# Patient Record
Sex: FEMALE | Race: WHITE | Employment: OTHER | URBAN - METROPOLITAN AREA
[De-identification: names, ages, dates, MRNs, and addresses within clinical notes are randomized per-mention and may not be internally consistent; named-entity substitution may affect disease eponyms.]

---

## 2017-01-09 ENCOUNTER — TELEPHONE (OUTPATIENT)
Dept: RHEUMATOLOGY | Facility: CLINIC | Age: 62
End: 2017-01-09

## 2017-01-09 ENCOUNTER — ALLIED HEALTH/NURSE VISIT (OUTPATIENT)
Dept: NURSING | Facility: CLINIC | Age: 62
End: 2017-01-09
Payer: COMMERCIAL

## 2017-01-09 DIAGNOSIS — M35.3 PMR (POLYMYALGIA RHEUMATICA) (H): ICD-10-CM

## 2017-01-09 DIAGNOSIS — E78.5 HYPERLIPIDEMIA LDL GOAL <130: Primary | ICD-10-CM

## 2017-01-09 DIAGNOSIS — Z79.52 LONG TERM CURRENT USE OF SYSTEMIC STEROIDS: ICD-10-CM

## 2017-01-09 DIAGNOSIS — Z23 NEED FOR PROPHYLACTIC VACCINATION AND INOCULATION AGAINST INFLUENZA: Primary | ICD-10-CM

## 2017-01-09 DIAGNOSIS — M35.3 PMR (POLYMYALGIA RHEUMATICA) (H): Primary | ICD-10-CM

## 2017-01-09 LAB
CRP SERPL-MCNC: <2.9 MG/L (ref 0–8)
ERYTHROCYTE [SEDIMENTATION RATE] IN BLOOD BY WESTERGREN METHOD: 15 MM/H (ref 0–30)

## 2017-01-09 PROCEDURE — 90686 IIV4 VACC NO PRSV 0.5 ML IM: CPT

## 2017-01-09 PROCEDURE — 90471 IMMUNIZATION ADMIN: CPT

## 2017-01-09 PROCEDURE — 82306 VITAMIN D 25 HYDROXY: CPT | Mod: 90 | Performed by: NURSE PRACTITIONER

## 2017-01-09 PROCEDURE — 85652 RBC SED RATE AUTOMATED: CPT | Performed by: INTERNAL MEDICINE

## 2017-01-09 PROCEDURE — 99000 SPECIMEN HANDLING OFFICE-LAB: CPT | Performed by: INTERNAL MEDICINE

## 2017-01-09 PROCEDURE — 99207 ZZC NO CHARGE NURSE ONLY: CPT

## 2017-01-09 PROCEDURE — 86140 C-REACTIVE PROTEIN: CPT | Mod: 90 | Performed by: INTERNAL MEDICINE

## 2017-01-09 PROCEDURE — 36415 COLL VENOUS BLD VENIPUNCTURE: CPT | Performed by: INTERNAL MEDICINE

## 2017-01-09 NOTE — TELEPHONE ENCOUNTER
lipitor       Last Written Prescription Date: 10/26/15  Last Fill Quantity: 90, # refills: 0    Last Office Visit with G, P or [facility] prescribing provider:  Going to the [facility] this week , will make a physical appt. After. She was here for lab work and a flu shot today  Future Office Visit:      CHOLESTEROL   Date Value Ref Range Status   05/06/2016 183 <200 mg/dL Final     HDL CHOLESTEROL   Date Value Ref Range Status   05/06/2016 57 >49 mg/dL Final     LDL CHOLESTEROL CALCULATED   Date Value Ref Range Status   05/06/2016 93 <100 mg/dL Final     Comment:     Desirable:       <100 mg/dl     LDL CHOLESTEROL DIRECT   Date Value Ref Range Status   05/08/2014 96 0 - 129 mg/dL Final     Comment:     Optimal:         <100 mg/dL   Near Optimal:     100-129 mg/dL   Borderline High:  130-159 mg/dL   High:             160-189 mg/dL   Very high:  greater than or equal to 190 mg/dL   Cannot estimate LDL when triglyceride exceeds 400 mg/dL       TRIGLYCERIDES   Date Value Ref Range Status   05/06/2016 166* <150 mg/dL Final     Comment:     Borderline high:  150-199 mg/dl   High:             200-499 mg/dl   Very high:       >499 mg/dl       CHOLESTEROL/HDL RATIO   Date Value Ref Range Status   10/26/2015 4.7 0.0 - 5.0 Final     ALT   Date Value Ref Range Status   09/07/2016 49 0 - 50 U/L Final

## 2017-01-09 NOTE — PROGRESS NOTES
Injectable Influenza Immunization Documentation    1.  Is the person to be vaccinated sick today?  No    2. Does the person to be vaccinated have an allergy to eggs or to a component of the vaccine?  No    3. Has the person to be vaccinated today ever had a serious reaction to influenza vaccine in the past?  No    4. Has the person to be vaccinated ever had Guillain-Stockdale syndrome?  No     Form completed by   Dimas Gaviria ma

## 2017-01-09 NOTE — TELEPHONE ENCOUNTER
Pt does not need any lab work for medication monitoring.  No mention of needed lab work in last OFV note.  Please advise.  Last OFV: 9/15/16

## 2017-01-09 NOTE — TELEPHONE ENCOUNTER
Pt called re: lab orders. CRP, sed rate. Also vit D (previously high). Has lab appt today (1/9/16), usually gets done prior to appts w/ Dr. Quevedo. Please advise. Can be reached at [phone]. Detailed VM fine. Message sent to rheum pool.

## 2017-01-10 LAB — DEPRECATED CALCIDIOL+CALCIFEROL SERPL-MC: 53 UG/L (ref 20–75)

## 2017-01-10 RX ORDER — ATORVASTATIN CALCIUM 10 MG/1
10 TABLET, FILM COATED ORAL DAILY
Qty: 30 TABLET | Refills: 0 | Status: SHIPPED | OUTPATIENT
Start: 2017-01-10 | End: 2017-01-30

## 2017-01-10 NOTE — TELEPHONE ENCOUNTER
Medication is being filled for 1 time refill only due to:  Patient needs to be seen because it has been more than one year since last visit.   Last seen 9/10/15.  Karey Mead RN

## 2017-01-11 NOTE — PROGRESS NOTES
Quick Note:    Results released to [email or web address]:   Ms. Giang,  Inflammatory markers are normal. Good!    Sincerely    Hernan Quevedo MD  ______

## 2017-01-13 ENCOUNTER — OFFICE VISIT (OUTPATIENT)
Dept: RHEUMATOLOGY | Facility: CLINIC | Age: 62
End: 2017-01-13
Attending: INTERNAL MEDICINE
Payer: COMMERCIAL

## 2017-01-13 VITALS
DIASTOLIC BLOOD PRESSURE: 85 MMHG | HEART RATE: 83 BPM | SYSTOLIC BLOOD PRESSURE: 134 MMHG | HEIGHT: 63 IN | BODY MASS INDEX: 21.62 KG/M2 | WEIGHT: 122 LBS | OXYGEN SATURATION: 100 %

## 2017-01-13 DIAGNOSIS — M35.3 PMR (POLYMYALGIA RHEUMATICA) (H): Primary | ICD-10-CM

## 2017-01-13 PROCEDURE — 99212 OFFICE O/P EST SF 10 MIN: CPT | Mod: ZF

## 2017-01-13 ASSESSMENT — PAIN SCALES - GENERAL: PAINLEVEL: NO PAIN (0)

## 2017-01-13 NOTE — MR AVS SNAPSHOT
"              After Visit Summary   1/13/2017    Shanae Giang    MRN: [id number]           Patient Information     Date Of Birth          1955        Visit Information        Provider Department      1/13/2017 11:00 AM Hernan Quevedo MD [facility] Rheumatology         Follow-ups after your visit        Follow-up notes from your care team     Return if symptoms worsen or fail to improve.      Who to contact     If you have questions or need follow up information about today's clinic visit or your schedule please contact [facility] RHEUMATOLOGY directly at [phone].  Normal or non-critical lab and imaging results will be communicated to you by ID[employer]gehart, letter or phone within 4 business days after the clinic has received the results. If you do not hear from us within 7 days, please contact the clinic through IDvergehart or phone. If you have a critical or abnormal lab result, we will notify you by phone as soon as possible.  Submit refill requests through [employer] or call your pharmacy and they will forward the refill request to us. Please allow 3 business days for your refill to be completed.          Additional Information About Your Visit        MyChart Information     [employer] gives you secure access to your electronic health record. If you see a primary care provider, you can also send messages to your care team and make appointments. If you have questions, please call your primary care clinic.  If you do not have a primary care provider, please call [phone] and they will assist you.        Care EveryWhere ID     This is your Care EveryWhere ID. This could be used by other organizations to access your Man medical records  [id number]        Your Vitals Were     Pulse Height BMI (Body Mass Index) Pulse Oximetry          83 1.6 m (5' 3\") 21.62 kg/m2 100%         Blood Pressure from Last 3 Encounters:   01/13/17 134/85   09/15/16 153/94   05/13/16 137/87    Weight from Last 3 Encounters: "   01/13/17 55.339 kg (122 lb)   09/15/16 57.607 kg (127 lb)   05/13/16 62.188 kg (137 lb 1.6 oz)              Today, you had the following     No orders found for display         Today's Medication Changes          These changes are accurate as of: 1/13/17 11:23 AM.  If you have any questions, ask your nurse or doctor.               Stop taking these medicines if you haven't already. Please contact your care team if you have questions.     melatonin 3 MG tablet   Stopped by:  Hernan Quevedo MD           predniSONE 1 MG tablet   Commonly known as:  DELTASONE   Stopped by:  Hernan Quevedo MD                    Primary Care Provider Office Phone # Fax #    Jeremías Arnold [phone]       [facility] [address] [place] [id number]        Thank you!     Thank you for choosing [facility] RHEUMATOLOGY  for your care. Our goal is always to provide you with excellent care. Hearing back from our patients is one way we can continue to improve our services. Please take a few minutes to complete the written survey that you may receive in the mail after your visit with us. Thank you!             Your Updated Medication List - Protect others around you: Learn how to safely use, store and throw away your medicines at www.disposemymeds.org.          This list is accurate as of: 1/13/17 11:23 AM.  Always use your most recent med list.                   Brand Name Dispense Instructions for use    atorvastatin 10 MG tablet    LIPITOR    30 tablet    Take 1 tablet (10 mg) by mouth daily       glucosamine-chondroitin 500-400 MG Caps per capsule      Take 1 capsule by mouth daily       MULTIVITAMIN TABS   OR      TAKE ONE TABLET BY MOUTH DAILY       TYLENOL PO      Take 500 mg by mouth every 6 hours as needed for mild pain or fever       UNABLE TO FIND      Take 1 tablet by mouth daily MEDICATION NAME: Tumeric       VITAMIN D (CHOLECALCIFEROL) PO      Take 1,000 Units  by mouth daily

## 2017-01-13 NOTE — PROGRESS NOTES
"Rheumatology Visit     Shanae Giang MRN# [id number]   YOB: 1955      Primary care provider: Jeremías Arnold          Assessment/Plan:   1. PMR w/o GCA - IN REMISSION OFF OF PREDNISONE.     No symptoms GCA. Tapered off prednisone December 2016, tolerating well. Age appropriate malignancy screen negative 2015. Echo NL. Last episode of flare up of symptoms in 8/15 was likely from secondary adrenal insufficiency. Elevation in CRP needs to be co-related with clinical symptoms.    2. PTSD and panic attacks. Per PCP  3. Borderline ALT elevation  4. Osteoarthritis in hands  5. Left hip arthroplasty at age 38    Patient had been educated about GCA symptoms.     Continue calcium and vitamin D 2000 u/day. DEXA shows osteopenia. Previously on fosamax.  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     Follow up as needed with new symptoms or inflammatory marker elevations.     ATTENDING TIE IN NOTE:   I saw the patient with the medical student, who acted as a scribe. This note accurately reflects my dictation and plan of care.    Hernan Quevedo MD   Rheumatic and Autoimmune Diseases        History of Present Illness:   Shanae Giang is a 59 year old female who here for follow-up for PMR, high CRP then NL with prednisone. Here with Gaby.     Forward hx 2-: C/o Fall 2014 profound lack of energy from work, with hip pain across her low back then at times mostly down her outside of right thigh/leg. Seen hip surgeon \"look good\". Burning, achey, hard to walk, rare shooting pain more of a throb. URI end of Oct/early Nov with sinus infections. Was taking advil, still pain. Not sure if influenza, but cleared up. Early Dec more this pronounced fatigue. Work 7-3 as . Would want to go to bed after bed. The same symptoms returned, may radiating right leg/thigh not always to " "foot and mid across back. Returned to hip surgeon \"call clear\" 3 weeks ago. Taking advil \"alot of advil 3-4 tablets four times a day\". Upper lip swelling 1/28 day seen hip specialist \"looked like a duck\" lasted 24 hours. No changes color, no tongue, or mouth irritation, no throat and breathing was good. Read insert for advil \"face swelling\" so stopped now not taking. Internist may not related to advil. Had taking on/off for 25 yrs w/o s/e. 1/29/2015 See PCP migratory left knee pain (no swelling), left thigh, left/right forearms, right knees, bilateral wrists. Diffuse. Only the upper back only this last time ws upper back everything hurt. Hard to walk or or lift up extremities. Not in her joints, more of the tissues. Never in hands. Some stiffness in her hands-never pain in the mornings mostly. +heels bother Will like to wash the dishes to help her hands stiff. (this hands stiffness mostly mornings then improves but not changing). No associated swelling or redness. Seen PCP 2-3. CRP 78. Thought was PMR, but wanted to do more work-up seen Dr. Hidalgo (internist) did more testing 2/10/2015. Increased pain, progressed, smell of food bad. 2/3 Physical therapy started-too much pain to do now. No morning hip girdles or upper back stiffness.   2-11 CRP 94. Prednisone 20 mg day. Took 3 days. 1st day 40% improved pain 10 down to 4/10, day 2-3 further some s/e poor sleep, more shakey, more energy, moodiness. Now-3rd day after prednisone. More energy. Still winded. \"Feels deconditioned from this process\". Improved pain in all areas [wrists, knees, back, thighs, forearms]. Now gone with tylenol #3 and prednisone, no hydrocodone but still winded. Appetite improved. Lost 10 lbs in 3 weeks during this. +wake with night sweats (this started 2 weeks ago with the intense pain), occasional chills but not now since Monday. Using heating pad. Gaby has been reading about PMR/GCA and asking her these questions-not really having. " "    Interval hx: 6/15  Next day after seen me, was almost 100% \"I wish you could have seen me\". Seen PCP for cancer work-up and NL echo. On prednisone now 10 mg day for the past month when seen Dr. Quevedo. Now with recent normal CRP will taper down by 1 mg month.     Admits post-traumatic stress syndrome, and was having this when I seen her. Improved energy. No symptoms. Quit her job. Mild symptoms with wean for about 3 days then ok (shoulder, right ankle)All other symptoms resolved. No infections. Tolerating the prednisone without any side-effects. 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 Denies fever, chills, cough, SOB, night sweats, weight loss, h/a, jaw claudication, scalp tenderness, vision changes. No OTC medications now. No joint or muscle pains. No neurological changes.     Interim history 8/15  EVault message: \"I am one of your patients who sees you for help managing PMR. I am set to reduce my prednisone dose gregory 8mg to 7mg this Friday. I have had quite a bit more struggles with this current reduction...more leg and hip pain and weakness; more fatigue; more disruption of nighttime sleep. With the past reductions the first two weeks of the new dose of prednisone I have always had more symptoms than the last two weeks. With the 8mg dose I have not noticed any improvement as the weeks have been progressing.. I think I would like to wait a week or two before reducing my prednisone from 8mg to 7mg. What do you think of this idea?    Current dose 8mg PO daily. Her above mentioned symptoms resolved after few weeks.   Her sed rate and CRP were normal when she had these active symptoms.     Interim history 10/15  Prednisone current dose is 7 mg PO daily.   Tapering down by 0.5mg every month.   Feeling stronger.   No new symptoms.   Inflammatory markers are normal.     Interim history 1/16  Prednisone current dose 5.5mg PO daily  Tapering down by 0.5mg every month.   No new symptoms.   First two weeks of " prednisone taper, she gets 'wierd' sensation in thighs; fatigue +ve but this resolves in about 2 weeks  No GCA symptoms    Interim history September 15, 2016    Prednisone current dose 1.5 mg PO daily  Tapering down by 0.5mg every month.   On and off muscle pain in right thigh.   No GCA symptoms  Feels good    2017  Off all prednisone since early 2016  Feeling well, started using tumeric   No fevers, headaches, jaw claudication  'Rubber-band stretching' sensation in thighs and hips immediately after waking up that resolves by the end of a shower. Also has 'creepy-crawling' sensation at night that seems to be associated with stress - feels like she needs to move.   Hands are stiff in the morning and improve within 1 hour or with a hot shower. No joint pains - chronic and stable over many years.           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  PGM-alcohol  Brother-HTN, alcohol, depression  Sister-alcohol, GERD, ulcers, psyche PTSD  Sister- depression  Father-alcohol, depression, stomach removed for ulcers, irritable bowel, intestines and stomach removed, see arthritis in hands (shows me DIPs and PIP)  No autoimmune disorders, psoriasis, UC, crohn's, SLE, RA, PsA, gout, lupus, scleroderma.   No MS    Social hx:  .  Previously [employer]. Now works as a potter. Never smoker no alcohol use. 3 adopted           Allergies/Medications:     Allergies   Allergen Reactions     Azithromycin Diarrhea     Ibuprofen Swelling     Around lips        Current Outpatient  "Prescriptions   Medication Sig Dispense Refill     UNABLE TO FIND Take 1 tablet by mouth daily MEDICATION NAME: Tumeric       atorvastatin (LIPITOR) 10 MG tablet Take 1 tablet (10 mg) by mouth daily 30 tablet 0     glucosamine-chondroitin 500-400 MG CAPS Take 1 capsule by mouth daily       VITAMIN D, CHOLECALCIFEROL, PO Take 1,000 Units by mouth daily       Acetaminophen (TYLENOL PO) Take 500 mg by mouth every 6 hours as needed for mild pain or fever       MULTIVITAMIN TABS   OR TAKE ONE TABLET BY MOUTH DAILY              Physical Exam:   Blood pressure 134/85, pulse 83, height 1.6 m (5' 3\"), weight 55.339 kg (122 lb), SpO2 100 %, not currently breastfeeding.  Wt Readings from Last 4 Encounters:   01/13/17 55.339 kg (122 lb)   09/15/16 57.607 kg (127 lb)   05/13/16 62.188 kg (137 lb 1.6 oz)   01/12/16 61.689 kg (136 lb)       Constitutional: appears stated age, pleasant and healthy appearing.   Eyes: nl EOM, PERRLA, vision, conjunctiva  ENT: nl external ears, nose,lips, teeth, gums.   Neck: supple and non-tender.  Resp: lungs clear to auscultation  CV: RRR, no murmurs, rubs or gallops, no edema  GI: no ABD mass or tenderness, no HSM. No RUQ pain.   : not tested  Lymph: no cervical, supraclavicular, or epitrochlear nodes  MSK: [place]ber[place] and praful nodes present over DIP and PIP joints respectively. Left elbow with 10 degrees of flexion contracture. TMJ, neck, shoulder,  wrist, hip, knee, and ankle were examined and  found normal. No active synovitis or deformity. Full ROM.  Normal  strength. No dactylitis,  tenosynovitis, enthespathy. Negative MCP squeeze.   Skin: no nail pitting, alopecia, rash, nodules or lesions  Psych: nl judgement, orientation, memory, affect.         Labs/Imaging:   Reviewed medications, labs, imaging, and EMR.     Results for orders placed or performed in visit on 01/09/17   ESR   Result Value Ref Range    Sed Rate 15 0 - 30 mm/h   CRP inflammation   Result Value Ref Range    CRP " Inflammation <2.9 0.0 - 8.0 mg/L   Vitamin D Deficiency   Result Value Ref Range    Vitamin D Deficiency screening 53 20 - 75 ug/L     Recent Labs   Lab Test  09/07/16   0948  05/04/15   1501  02/11/15   1406   WBC  6.7  10.7  11.3*   HGB  13.1  12.8  11.2*   HCT  41.5  41.6  36.6   MCV  94  91  91   PLT  219  258  514*   BUN   --    --   14   TSH   --    --   1.23   AST  22  21  21   ALT  49  53*  53*   ALKPHOS   --    --   104     Component      Latest Ref Rng 2/3/2015 2/11/2015   Rheumatoid Factor      <20 IU/mL <20    Sed Rate      0 - 30 mm/h 80 (H)    CRP Inflammation      0.0 - 8.0 mg/L 78.0 (H) 94.0 (H)   MICHAEL Screen by EIA      <1.0 <1.0    Interpretation:  Negative    Cyclic Cit Pept IgG/IgA      <20 UNITS <20    Interpretation:  Negative    Lyme Disease Antibodies Serum      0.00 - 0.89 0.203

## 2017-01-13 NOTE — NURSING NOTE
"Chief Complaint   Patient presents with     RECHECK     4 month follow up for PMR       Initial /85 mmHg  Pulse 83  Ht 1.6 m (5' 3\")  Wt 55.339 kg (122 lb)  BMI 21.62 kg/m2  SpO2 100% Estimated body mass index is 21.62 kg/(m^2) as calculated from the following:    Height as of this encounter: 1.6 m (5' 3\").    Weight as of this encounter: 55.339 kg (122 lb).  BP completed using cuff size: ruchi Ash CMA    "

## 2017-01-13 NOTE — Clinical Note
"1/13/2017      RE: Shanae Giang  [address] [facility] [address]       Rheumatology Visit     Shanae Giang MRN# [id number]   YOB: 1955      Primary care provider: Jeremías Arnold          Assessment/Plan:   1. PMR w/o GCA - IN REMISSION OFF OF PREDNISONE.     No symptoms GCA. Tapered off prednisone December 2016, tolerating well. Age appropriate malignancy screen negative 2015. Echo NL. Last episode of flare up of symptoms in 8/15 was likely from secondary adrenal insufficiency. Elevation in CRP needs to be co-related with clinical symptoms.    2. PTSD and panic attacks. Per PCP  3. Borderline ALT elevation  4. Osteoarthritis in hands  5. Left hip arthroplasty at age 38    Patient had been educated about GCA symptoms.     Continue calcium and vitamin D 2000 u/day. DEXA shows osteopenia. Previously on fosamax.  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     Follow up as needed with new symptoms or inflammatory marker elevations.     ATTENDING TIE IN NOTE:   I saw the patient with the medical student, who acted as a scribe. This note accurately reflects my dictation and plan of care.    Hernan Quevedo MD   Rheumatic and Autoimmune Diseases        History of Present Illness:   Shanae Giang is a 59 year old female who here for follow-up for PMR, high CRP then NL with prednisone. Here with Gaby.     Forward hx 2-: C/o Fall 2014 profound lack of energy from work, with hip pain across her low back then at times mostly down her outside of right thigh/leg. Seen hip surgeon \"look good\". Burning, achey, hard to walk, rare shooting pain more of a throb. URI end of Oct/early Nov with sinus infections. Was taking advil, still pain. Not sure if influenza, but cleared up. Early Dec more this pronounced fatigue. Work 7-3 as . Would want " "to go to bed after bed. The same symptoms returned, may radiating right leg/thigh not always to foot and mid across back. Returned to hip surgeon \"call clear\" 3 weeks ago. Taking advil \"alot of advil 3-4 tablets four times a day\". Upper lip swelling 1/28 day seen hip specialist \"looked like a duck\" lasted 24 hours. No changes color, no tongue, or mouth irritation, no throat and breathing was good. Read insert for advil \"face swelling\" so stopped now not taking. Internist may not related to advil. Had taking on/off for 25 yrs w/o s/e. 1/29/2015 See PCP migratory left knee pain (no swelling), left thigh, left/right forearms, right knees, bilateral wrists. Diffuse. Only the upper back only this last time ws upper back everything hurt. Hard to walk or or lift up extremities. Not in her joints, more of the tissues. Never in hands. Some stiffness in her hands-never pain in the mornings mostly. +heels bother Will like to wash the dishes to help her hands stiff. (this hands stiffness mostly mornings then improves but not changing). No associated swelling or redness. Seen PCP 2-3. CRP 78. Thought was PMR, but wanted to do more work-up seen Dr. Hidalgo (internist) did more testing 2/10/2015. Increased pain, progressed, smell of food bad. 2/3 Physical therapy started-too much pain to do now. No morning hip girdles or upper back stiffness.   2-11 CRP 94. Prednisone 20 mg day. Took 3 days. 1st day 40% improved pain 10 down to 4/10, day 2-3 further some s/e poor sleep, more shakey, more energy, moodiness. Now-3rd day after prednisone. More energy. Still winded. \"Feels deconditioned from this process\". Improved pain in all areas [wrists, knees, back, thighs, forearms]. Now gone with tylenol #3 and prednisone, no hydrocodone but still winded. Appetite improved. Lost 10 lbs in 3 weeks during this. +wake with night sweats (this started 2 weeks ago with the intense pain), occasional chills but not now since Monday. Using heating pad. " "Gaby has been reading about PMR/GCA and asking her these questions-not really having.     Interval hx: 6/15  Next day after seen me, was almost 100% \"I wish you could have seen me\". Seen PCP for cancer work-up and NL echo. On prednisone now 10 mg day for the past month when seen Dr. Quevedo. Now with recent normal CRP will taper down by 1 mg month.     Admits post-traumatic stress syndrome, and was having this when I seen her. Improved energy. No symptoms. Quit her job. Mild symptoms with wean for about 3 days then ok (shoulder, right ankle)All other symptoms resolved. No infections. Tolerating the prednisone without any side-effects. 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 Denies fever, chills, cough, SOB, night sweats, weight loss, h/a, jaw claudication, scalp tenderness, vision changes. No OTC medications now. No joint or muscle pains. No neurological changes.     Interim history 8/15  [employer] message: \"I am one of your patients who sees you for help managing PMR. I am set to reduce my prednisone dose gregory 8mg to 7mg this Friday. I have had quite a bit more struggles with this current reduction...more leg and hip pain and weakness; more fatigue; more disruption of nighttime sleep. With the past reductions the first two weeks of the new dose of prednisone I have always had more symptoms than the last two weeks. With the 8mg dose I have not noticed any improvement as the weeks have been progressing.. I think I would like to wait a week or two before reducing my prednisone from 8mg to 7mg. What do you think of this idea?    Current dose 8mg PO daily. Her above mentioned symptoms resolved after few weeks.   Her sed rate and CRP were normal when she had these active symptoms.     Interim history 10/15  Prednisone current dose is 7 mg PO daily.   Tapering down by 0.5mg every month.   Feeling stronger.   No new symptoms.   Inflammatory markers are normal.     Interim history 1/16  Prednisone current dose 5.5mg " PO daily  Tapering down by 0.5mg every month.   No new symptoms.   First two weeks of prednisone taper, she gets 'wierd' sensation in thighs; fatigue +ve but this resolves in about 2 weeks  No GCA symptoms    Interim history September 15, 2016    Prednisone current dose 1.5 mg PO daily  Tapering down by 0.5mg every month.   On and off muscle pain in right thigh.   No GCA symptoms  Feels good    2017  Off all prednisone since early 2016  Feeling well, started using tumeric   No fevers, headaches, jaw claudication  'Rubber-band stretching' sensation in thighs and hips immediately after waking up that resolves by the end of a shower. Also has 'creepy-crawling' sensation at night that seems to be associated with stress - feels like she needs to move.   Hands are stiff in the morning and improve within 1 hour or with a hot shower. No joint pains - chronic and stable over many years.           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  PGM-alcohol  Brother-HTN, alcohol, depression  Sister-alcohol, GERD, ulcers, psyche PTSD  Sister- depression  Father-alcohol, depression, stomach removed for ulcers, irritable bowel, intestines and stomach removed, see arthritis in hands (shows me DIPs and PIP)  No autoimmune disorders, psoriasis, UC, crohn's, SLE, RA, PsA, gout, lupus, scleroderma.   No MS    Social hx:  .  Previously [employer]. Now works as a potter. Never smoker no alcohol use. 3 adopted           Allergies/Medications:     Allergies   Allergen Reactions      "Azithromycin Diarrhea     Ibuprofen Swelling     Around lips        Current Outpatient Prescriptions   Medication Sig Dispense Refill     UNABLE TO FIND Take 1 tablet by mouth daily MEDICATION NAME: Tumeric       atorvastatin (LIPITOR) 10 MG tablet Take 1 tablet (10 mg) by mouth daily 30 tablet 0     glucosamine-chondroitin 500-400 MG CAPS Take 1 capsule by mouth daily       VITAMIN D, CHOLECALCIFEROL, PO Take 1,000 Units by mouth daily       Acetaminophen (TYLENOL PO) Take 500 mg by mouth every 6 hours as needed for mild pain or fever       MULTIVITAMIN TABS   OR TAKE ONE TABLET BY MOUTH DAILY              Physical Exam:   Blood pressure 134/85, pulse 83, height 1.6 m (5' 3\"), weight 55.339 kg (122 lb), SpO2 100 %, not currently breastfeeding.  Wt Readings from Last 4 Encounters:   01/13/17 55.339 kg (122 lb)   09/15/16 57.607 kg (127 lb)   05/13/16 62.188 kg (137 lb 1.6 oz)   01/12/16 61.689 kg (136 lb)       Constitutional: appears stated age, pleasant and healthy appearing.   Eyes: nl EOM, PERRLA, vision, conjunctiva  ENT: nl external ears, nose,lips, teeth, gums.   Neck: supple and non-tender.  Resp: lungs clear to auscultation  CV: RRR, no murmurs, rubs or gallops, no edema  GI: no ABD mass or tenderness, no HSM. No RUQ pain.   : not tested  Lymph: no cervical, supraclavicular, or epitrochlear nodes  MSK: Heberden and praful nodes present over DIP and PIP joints respectively. Left elbow with 10 degrees of flexion contracture. TMJ, neck, shoulder,  wrist, hip, knee, and ankle were examined and  found normal. No active synovitis or deformity. Full ROM.  Normal  strength. No dactylitis,  tenosynovitis, enthespathy. Negative MCP squeeze.   Skin: no nail pitting, alopecia, rash, nodules or lesions  Psych: nl judgement, orientation, memory, affect.         Labs/Imaging:   Reviewed medications, labs, imaging, and EMR.     Results for orders placed or performed in visit on 01/09/17   ESR   Result Value Ref " Range    Sed Rate 15 0 - 30 mm/h   CRP inflammation   Result Value Ref Range    CRP Inflammation <2.9 0.0 - 8.0 mg/L   Vitamin D Deficiency   Result Value Ref Range    Vitamin D Deficiency screening 53 20 - 75 ug/L     Recent Labs   Lab Test  09/07/16   0948  05/04/15   1501  02/11/15   1406   WBC  6.7  10.7  11.3*   HGB  13.1  12.8  11.2*   HCT  41.5  41.6  36.6   MCV  94  91  91   PLT  219  258  514*   BUN   --    --   14   TSH   --    --   1.23   AST  22  21  21   ALT  49  53*  53*   ALKPHOS   --    --   104     Component      Latest Ref Rng 2/3/2015 2/11/2015   Rheumatoid Factor      <20 IU/mL <20    Sed Rate      0 - 30 mm/h 80 (H)    CRP Inflammation      0.0 - 8.0 mg/L 78.0 (H) 94.0 (H)   MICHAEL Screen by EIA      <1.0 <1.0    Interpretation:  Negative    Cyclic Cit Pept IgG/IgA      <20 UNITS <20    Interpretation:  Negative    Lyme Disease Antibodies Serum      0.00 - 0.89 0.203          Hernan Quevedo MD

## 2017-01-23 RX ORDER — ATORVASTATIN CALCIUM 10 MG/1
TABLET, FILM COATED ORAL
Start: 2017-01-23

## 2017-01-23 NOTE — TELEPHONE ENCOUNTER
Denied  Refill request too early; Rx sent 1/10/2017 for 1 month supply; patient needs appt  Left non detailed VM for pt asking that they callback and schedule (annual exam)  Tonja MARMOLEJO RN      Pending Prescriptions:                       Disp   Refills    atorvastatin (LIPITOR) 10 MG tablet [Pharm*90 tab*PRN      Sig: TAKE 1 TABLET BY MOUTH DAILY         Last Written Prescription Date: 1/10/2017  Last Fill Quantity: 30, # refills: 0    Last Office Visit with [facility], [facility] or [facility] prescribing provider:  9/10/2015   Future Office Visit:      CHOLESTEROL   Date Value Ref Range Status   05/06/2016 183 <200 mg/dL Final     HDL CHOLESTEROL   Date Value Ref Range Status   05/06/2016 57 >49 mg/dL Final     LDL CHOLESTEROL CALCULATED   Date Value Ref Range Status   05/06/2016 93 <100 mg/dL Final     Comment:     Desirable:       <100 mg/dl     LDL CHOLESTEROL DIRECT   Date Value Ref Range Status   05/08/2014 96 0 - 129 mg/dL Final     Comment:     Optimal:         <100 mg/dL   Near Optimal:     100-129 mg/dL   Borderline High:  130-159 mg/dL   High:             160-189 mg/dL   Very high:  greater than or equal to 190 mg/dL   Cannot estimate LDL when triglyceride exceeds 400 mg/dL       TRIGLYCERIDES   Date Value Ref Range Status   05/06/2016 166* <150 mg/dL Final     Comment:     Borderline high:  150-199 mg/dl   High:             200-499 mg/dl   Very high:       >499 mg/dl       CHOLESTEROL/HDL RATIO   Date Value Ref Range Status   10/26/2015 4.7 0.0 - 5.0 Final     ALT   Date Value Ref Range Status   09/07/2016 49 0 - 50 U/L Final

## 2017-01-30 RX ORDER — ALENDRONATE SODIUM 35 MG/1
TABLET ORAL
Refills: 5 | COMMUNITY
Start: 2016-08-22 | End: 2017-01-31

## 2017-01-31 ENCOUNTER — OFFICE VISIT (OUTPATIENT)
Dept: FAMILY MEDICINE | Facility: CLINIC | Age: 62
End: 2017-01-31
Payer: COMMERCIAL

## 2017-01-31 VITALS
HEART RATE: 97 BPM | DIASTOLIC BLOOD PRESSURE: 74 MMHG | RESPIRATION RATE: 14 BRPM | BODY MASS INDEX: 21.79 KG/M2 | TEMPERATURE: 98.1 F | SYSTOLIC BLOOD PRESSURE: 126 MMHG | HEIGHT: 63 IN | OXYGEN SATURATION: 99 % | WEIGHT: 123 LBS

## 2017-01-31 DIAGNOSIS — Z00.01 ENCOUNTER FOR ROUTINE ADULT HEALTH EXAMINATION WITH ABNORMAL FINDINGS: Primary | ICD-10-CM

## 2017-01-31 DIAGNOSIS — E78.5 HYPERLIPIDEMIA LDL GOAL <130: ICD-10-CM

## 2017-01-31 DIAGNOSIS — D23.5 BENIGN NEOPLASM OF SKIN OF TRUNK, EXCEPT SCROTUM: ICD-10-CM

## 2017-01-31 DIAGNOSIS — Z12.11 SCREEN FOR COLON CANCER: ICD-10-CM

## 2017-01-31 PROCEDURE — 11100 HC BIOPSY SKIN/SUBQ/MUC MEM, SINGLE LESION: CPT | Performed by: FAMILY MEDICINE

## 2017-01-31 PROCEDURE — 99396 PREV VISIT EST AGE 40-64: CPT | Mod: 25 | Performed by: FAMILY MEDICINE

## 2017-01-31 RX ORDER — ATORVASTATIN CALCIUM 10 MG/1
10 TABLET, FILM COATED ORAL DAILY
Qty: 90 TABLET | Refills: 3 | Status: SHIPPED | OUTPATIENT
Start: 2017-01-31 | End: 2019-11-07 | Stop reason: ALTCHOICE

## 2017-01-31 NOTE — PATIENT INSTRUCTIONS
Preventive Health Recommendations  Female Ages 50 - 64    PLEASE CALL TO SCHEDULE YOUR MAMMOGRAM  [facility] [phone]  [facility] [phone]        Yearly exam: See your health care provider every year in order to  o Review health changes.   o Discuss preventive care.    o Review your medicines if your doctor has prescribed any.      Get a Pap test every three years (unless you have an abnormal result and your provider advises testing more often).    If you get Pap tests with HPV test, you only need to test every 5 years, unless you have an abnormal result.     You do not need a Pap test if your uterus was removed (hysterectomy) and you have not had cancer.    You should be tested each year for STDs (sexually transmitted diseases) if you're at risk.     Have a mammogram every 1 to 2 years.    Have a colonoscopy at age 50, or have a yearly FIT test (stool test). These exams screen for colon cancer.      Have a cholesterol test every 5 years, or more often if advised.    Have a diabetes test (fasting glucose) every three years. If you are at risk for diabetes, you should have this test more often.     If you are at risk for osteoporosis (brittle bone disease), think about having a bone density scan (DEXA).    Shots: Get a flu shot each year. Get a tetanus shot every 10 years.    Nutrition:     Eat at least 5 servings of fruits and vegetables each day.    Eat whole-grain bread, whole-wheat pasta and brown rice instead of white grains and rice.    Talk to your provider about Calcium and Vitamin D.     Lifestyle    Exercise at least 150 minutes a week (30 minutes a day, 5 days a week). This will help you control your weight and prevent disease.    Limit alcohol to one drink per day.    No smoking.     Wear sunscreen to prevent skin cancer.     See your dentist every six months for an exam and cleaning.    See your eye doctor every 1 to 2 years.

## 2017-01-31 NOTE — PROGRESS NOTES
Answers for HPI/ROS submitted by the patient on 1/29/2017   Annual Exam:  Getting at least 3 servings of Calcium per day:: Yes  Bi-annual eye exam:: Yes  Dental care twice a year:: Yes  Sleep apnea or symptoms of sleep apnea:: None  Diet:: Other  Frequency of exercise:: 2-3 days/week  Duration of exercise:: 15-30 minutes  Taking medications regularly:: Yes  Medication side effects:: Not applicable  Additional concerns today:: No  PHQ-2 Depressed: Not at all, Not at all  PHQ-2 Score: 0

## 2017-01-31 NOTE — NURSING NOTE
"  Chief Complaint   Patient presents with     Physical     /74 mmHg  Pulse 97  Temp(Src) 98.1  F (36.7  C) (Oral)  Resp 14  Ht 5' 3\" (1.6 m)  Wt 123 lb (55.792 kg)  BMI 21.79 kg/m2  SpO2 99%  Breastfeeding? No Estimated body mass index is 21.79 kg/(m^2) as calculated from the following:    Height as of this encounter: 5' 3\" (1.6 m).    Weight as of this encounter: 123 lb (55.792 kg).  BP completed using cuff size: regular       Health Maintenance due pending provider review:  Mammogram, Pap Smear and Colonoscopy/FIT    PAP--Possibly completing today, per Provider review, MAMMO/COLON--Gave pt phone number, and pended order for Mammo and/or Colonoscopy and FIT--test ordered, pt advised to  from lab    JESSICA Dave CMA      "

## 2017-01-31 NOTE — PROGRESS NOTES
SUBJECTIVE:     CC: Shanae Giang is an 61 year old woman who presents for preventive health visit.     Physical  Annual:     Getting at least 3 servings of Calcium per day::  Yes    Bi-annual eye exam::  Yes    Dental care twice a year::  Yes    Sleep apnea or symptoms of sleep apnea::  None    Diet::  Other    Frequency of exercise::  2-3 days/week    Duration of exercise::  15-30 minutes    Taking medications regularly::  Yes    Medication side effects::  Not applicable    Additional concerns today::  No    [facility]            Today's PHQ-2 Score:   PHQ-2 ( 1999 [employer]) 1/29/2017   Q1: Little interest or pleasure in doing things -   Q2: Feeling down, depressed or hopeless -   PHQ-2 Score -   Little interest or pleasure in doing things Not at all   Feeling down, depressed or hopeless Not at all   PHQ-2 Score 0       Abuse: Current or Past(Physical, Sexual or Emotional)- No  Do you feel safe in your environment - Yes    Social History   Substance Use Topics     Smoking status: Never Smoker      Smokeless tobacco: Never Used     Alcohol Use: 0.0 - 0.5 oz/week     0-1 drink(s) per week      Comment: Very Rarely-3 a year     The patient does not drink >3 drinks per day nor >7 drinks per week.    Recent Labs   Lab Test  05/06/16   0923  10/26/15   0843   12/05/12   0709   CHOL  183  297*   --   181   HDL  57  63   --   57   LDL  93  200*   < >  106   TRIG  166*  169*   --   85   CHOLHDLRATIO   --   4.7   --   3.2   NHDL  126   --    --    --     < > = values in this interval not displayed.       Reviewed orders with patient.  Reviewed health maintenance and updated orders accordingly - Yes    Mammo Decision Support:  Patient over age 50, mutual decision to screen reflected in health maintenance.    Pertinent mammograms are reviewed under the imaging tab.  History of abnormal Pap smear: NO - age 30-65 PAP every 5 years with negative HPV co-testing recommended  All Histories reviewed  and updated in Epic.      ROS:She is off prednisone, feeling good, hasn't gone back to work and thinks she probably won't because her partner needs a lot of help. She has a mole under her left breast for years that has bothered her and she would like removed. It appears completely benign and I just did an excision, reddish skin tag type but big enough that it needed injectable Xylocaine and she understands that it will heal with a scar which will gradually fade. Mammograms can be every 5 years and she wants to do the sit test rather than a colonoscopy.  C: NEGATIVE for fever, chills, change in weight  I: NEGATIVE for worrisome rashes, moles or lesions  E: NEGATIVE for vision changes or irritation  ENT: NEGATIVE for ear, mouth and throat problems  R: NEGATIVE for significant cough or SOB  B: NEGATIVE for masses, tenderness or discharge  CV: NEGATIVE for chest pain, palpitations or peripheral edema  GI: NEGATIVE for nausea, abdominal pain, heartburn, or change in bowel habits  : NEGATIVE for unusual urinary or vaginal symptoms. No vaginal bleeding.  M: NEGATIVE for significant arthralgias or myalgia  N: NEGATIVE for weakness, dizziness or paresthesias  P: NEGATIVE for changes in mood or affect     Problem list, Medication list, Allergies, and Medical/Social/Surgical histories reviewed in [facility] and updated as appropriate.  OBJECTIVE:     There were no vitals taken for this visit.  EXAM:  GENERAL APPEARANCE: healthy, alert and no distress  EYES: Eyes grossly normal to inspection, PERRL and conjunctivae and sclerae normal  HENT: ear canals and TM's normal, nose and mouth without ulcers or lesions, oropharynx clear and oral mucous membranes moist  NECK: no adenopathy, no asymmetry, masses, or scars and thyroid normal to palpation  RESP: lungs clear to auscultation - no rales, rhonchi or wheezes  BREAST: normal without masses, tenderness or nipple discharge and no palpable axillary masses or adenopathy  CV: regular rate  "and rhythm, normal S1 S2, no S3 or S4, no murmur, click or rub, no peripheral edema and peripheral pulses strong  ABDOMEN: soft, nontender, no hepatosplenomegaly, no masses and bowel sounds normal  MS: no musculoskeletal defects are noted and gait is age appropriate without ataxia  SKIN: no suspicious lesions or rashes and excision as above-no need for pathology  NEURO: Normal strength and tone, sensory exam grossly normal, mentation intact and speech normal  PSYCH: mentation appears normal and affect normal/bright    ASSESSMENT/PLAN:     1. Encounter for routine adult health examination with abnormal findings    - Glucose; Future  - Hepatitis C antibody; Future  - *MA Screening Digital Bilateral; Future    2. Screen for colon cancer    - Fecal colorectal cancer screen (FIT); Future    3. Hyperlipidemia LDL goal <130    - atorvastatin (LIPITOR) 10 MG tablet; Take 1 tablet (10 mg) by mouth daily  Dispense: 90 tablet; Refill: 3  - Lipid Profile with reflex to direct LDL; Future    4. Benign neoplasm of skin of trunk, except scrotum    - BIOPSY SKIN/SUBQ/MUC MEM, SINGLE LESION    COUNSELING:  Reviewed preventive health counseling, as reflected in patient instructions       Regular exercise       Healthy diet/nutrition         reports that she has never smoked. She has never used smokeless tobacco.    Estimated body mass index is 21.62 kg/(m^2) as calculated from the following:    Height as of 1/13/17: 5' 3\" (1.6 m).    Weight as of 1/13/17: 122 lb (55.339 kg).       Counseling Resources:  ATP IV Guidelines  Pooled Cohorts Equation Calculator  Breast Cancer Risk Calculator  FRAX Risk Assessment  ICSI Preventive Guidelines  Dietary Guidelines for Americans, 2010  USDA's MyPlate  ASA Prophylaxis  Lung CA Screening    Jeremías Arnold MD  [facility]    Answers for HPI/ROS submitted by the patient on 1/29/2017   PHQ-2 Depressed: Not at all, Not at all  PHQ-2 Score: 0      "

## 2017-02-08 PROCEDURE — 82274 ASSAY TEST FOR BLOOD FECAL: CPT | Performed by: FAMILY MEDICINE

## 2017-02-10 DIAGNOSIS — Z12.11 SCREEN FOR COLON CANCER: ICD-10-CM

## 2017-02-10 LAB — HEMOCCULT STL QL IA: NEGATIVE

## 2017-03-13 DIAGNOSIS — E78.5 HYPERLIPIDEMIA LDL GOAL <130: ICD-10-CM

## 2017-03-13 DIAGNOSIS — Z00.01 ENCOUNTER FOR ROUTINE ADULT HEALTH EXAMINATION WITH ABNORMAL FINDINGS: ICD-10-CM

## 2017-03-13 LAB
CHOLEST SERPL-MCNC: 181 MG/DL
GLUCOSE SERPL-MCNC: 115 MG/DL (ref 70–99)
HCV AB SERPL QL IA: NORMAL
HDLC SERPL-MCNC: 49 MG/DL
LDLC SERPL CALC-MCNC: 108 MG/DL
NONHDLC SERPL-MCNC: 132 MG/DL
TRIGL SERPL-MCNC: 121 MG/DL

## 2017-03-13 PROCEDURE — 80061 LIPID PANEL: CPT | Performed by: FAMILY MEDICINE

## 2017-03-13 PROCEDURE — 36415 COLL VENOUS BLD VENIPUNCTURE: CPT | Performed by: FAMILY MEDICINE

## 2017-03-13 PROCEDURE — 82947 ASSAY GLUCOSE BLOOD QUANT: CPT | Performed by: FAMILY MEDICINE

## 2017-03-13 PROCEDURE — 86803 HEPATITIS C AB TEST: CPT | Performed by: FAMILY MEDICINE

## 2017-03-13 NOTE — PROGRESS NOTES
Assuming you were fasting this is high but nowhere near the diabetes range. Let's talk when you come in

## 2017-11-02 ENCOUNTER — ALLIED HEALTH/NURSE VISIT (OUTPATIENT)
Dept: NURSING | Facility: CLINIC | Age: 62
End: 2017-11-02
Payer: COMMERCIAL

## 2017-11-02 DIAGNOSIS — Z23 NEED FOR PROPHYLACTIC VACCINATION AND INOCULATION AGAINST INFLUENZA: Primary | ICD-10-CM

## 2017-11-02 PROCEDURE — 90471 IMMUNIZATION ADMIN: CPT

## 2017-11-02 PROCEDURE — 99207 ZZC NO CHARGE NURSE ONLY: CPT

## 2017-11-02 PROCEDURE — 90686 IIV4 VACC NO PRSV 0.5 ML IM: CPT

## 2017-11-02 NOTE — MR AVS SNAPSHOT
After Visit Summary   11/2/2017    Shanae Giang    MRN: [id number]           Patient Information     Date Of Birth          1955        Visit Information        Provider Department      11/2/2017 1:00 PM CORNELL ISLES NURSE [facility]        Today's Diagnoses     Need for prophylactic vaccination and inoculation against influenza    -  1       Follow-ups after your visit        Who to contact     If you have questions or need follow up information about today's clinic visit or your schedule please contact [facility] directly at [phone].  Normal or non-critical lab and imaging results will be communicated to you by Pavegen Systemshart, letter or phone within 4 business days after the clinic has received the results. If you do not hear from us within 7 days, please contact the clinic through Loehmann'st or phone. If you have a critical or abnormal lab result, we will notify you by phone as soon as possible.  Submit refill requests through Snapwiz or call your pharmacy and they will forward the refill request to us. Please allow 3 business days for your refill to be completed.          Additional Information About Your Visit        MyChart Information     Snapwiz gives you secure access to your electronic health record. If you see a primary care provider, you can also send messages to your care team and make appointments. If you have questions, please call your primary care clinic.  If you do not have a primary care provider, please call [phone] and they will assist you.        Care EveryWhere ID     This is your Care EveryWhere ID. This could be used by other organizations to access your [facility] medical records  [id number]         Blood Pressure from Last 3 Encounters:   01/31/17 126/74   01/13/17 134/85   09/15/16 (!) 153/94    Weight from Last 3 Encounters:   01/31/17 123 lb (55.8 kg)   01/13/17 122 lb (55.3 kg)   09/15/16 127 lb (57.6 kg)              We Performed the Following      FLU VAC, SPLIT VIRUS IM > 3 YO (QUADRIVALENT) [46664]     Vaccine Administration, Initial [68288]        Primary Care Provider Office Phone # Fax #    Jeremías Arnold [phone] [address] [address]        Equal Access to Services     LISANDRO HAYES : Hadii aad ku hadasho Soomaali, waaxda luqadaha, qaybta kaalmada adeegyada, waxdanni ortiz johnnyn louisa rylanddexter cano . So [facility] [phone].    ATENCIÓN: Si habla español, tiene a chatman disposición servicios gratuitos de asistencia lingüística. [facility] [phone].    We comply with applicable federal civil rights laws and [place] laws. We do not discriminate on the basis of race, color, national origin, age, disability, sex, sexual orientation, or gender identity.            Thank you!     Thank you for choosing [facility]  for your care. Our goal is always to provide you with excellent care. Hearing back from our patients is one way we can continue to improve our services. Please take a few minutes to complete the written survey that you may receive in the mail after your visit with us. Thank you!             Your Updated Medication List - Protect others around you: Learn how to safely use, store and throw away your medicines at www.disposemymeds.org.          This list is accurate as of: 11/2/17  1:20 PM.  Always use your most recent med list.                   Brand Name Dispense Instructions for use Diagnosis    atorvastatin 10 MG tablet    LIPITOR    90 tablet    Take 1 tablet (10 mg) by mouth daily    Hyperlipidemia LDL goal <130       glucosamine-chondroitin 500-400 MG Caps per capsule      Take 1 capsule by mouth 2 times daily        MULTIVITAMIN TABS   OR      TAKE ONE TABLET BY MOUTH DAILY        TYLENOL PO      Take 500 mg by mouth every 6 hours as needed for mild pain or fever        UNABLE TO FIND      Take 1 tablet by mouth daily MEDICATION NAME: Tumeric        VITAMIN D (CHOLECALCIFEROL)  PO      Take 1,000 Units by mouth daily

## 2017-11-02 NOTE — PROGRESS NOTES

## 2017-12-21 ENCOUNTER — HOSPITAL ENCOUNTER (OUTPATIENT)
Dept: MAMMOGRAPHY | Facility: CLINIC | Age: 62
Discharge: HOME | End: 2017-12-21
Attending: FAMILY MEDICINE | Admitting: FAMILY MEDICINE
Payer: COMMERCIAL

## 2017-12-21 DIAGNOSIS — Z00.01 ENCOUNTER FOR ROUTINE ADULT HEALTH EXAMINATION WITH ABNORMAL FINDINGS: ICD-10-CM

## 2017-12-21 PROCEDURE — G0202 SCR MAMMO BI INCL CAD: HCPCS

## 2018-01-29 DIAGNOSIS — E78.5 HYPERLIPIDEMIA LDL GOAL <130: ICD-10-CM

## 2018-01-29 RX ORDER — ATORVASTATIN CALCIUM 10 MG/1
TABLET, FILM COATED ORAL
Start: 2018-01-29

## 2018-01-29 RX ORDER — ATORVASTATIN CALCIUM 10 MG/1
10 TABLET, FILM COATED ORAL DAILY
Qty: 30 TABLET | Refills: 0 | Status: CANCELLED | OUTPATIENT
Start: 2018-01-29

## 2018-01-29 NOTE — TELEPHONE ENCOUNTER
Atorvastatin:  Former [facility] patient.  Last seen 1/31/17.  Sent patient a [employer] message.  Needs to establish care with new PCP for physical and fasting labs.  Karey Mead RN

## 2018-02-02 NOTE — TELEPHONE ENCOUNTER
Patient read [employer] message  Has not scheduled appt though  Will wait for her to schedule  Tonja MARMOLEJO RN

## 2018-02-09 ENCOUNTER — OFFICE VISIT (OUTPATIENT)
Dept: FAMILY MEDICINE | Facility: CLINIC | Age: 63
End: 2018-02-09
Payer: COMMERCIAL

## 2018-02-09 ENCOUNTER — APPOINTMENT (OUTPATIENT)
Dept: LAB | Facility: CLINIC | Age: 63
End: 2018-02-09
Payer: COMMERCIAL

## 2018-02-09 VITALS
BODY MASS INDEX: 23.19 KG/M2 | TEMPERATURE: 97 F | WEIGHT: 126 LBS | HEIGHT: 62 IN | HEART RATE: 86 BPM | RESPIRATION RATE: 16 BRPM | SYSTOLIC BLOOD PRESSURE: 139 MMHG | DIASTOLIC BLOOD PRESSURE: 81 MMHG | OXYGEN SATURATION: 99 %

## 2018-02-09 DIAGNOSIS — Z00.00 ENCOUNTER FOR ROUTINE ADULT HEALTH EXAMINATION WITHOUT ABNORMAL FINDINGS: Primary | ICD-10-CM

## 2018-02-09 DIAGNOSIS — E78.5 HYPERLIPIDEMIA LDL GOAL <130: ICD-10-CM

## 2018-02-09 DIAGNOSIS — M35.3 PMR (POLYMYALGIA RHEUMATICA) (H): ICD-10-CM

## 2018-02-09 DIAGNOSIS — Z83.3 FAMILY HISTORY OF DIABETES MELLITUS: ICD-10-CM

## 2018-02-09 LAB
CRP SERPL-MCNC: <2.9 MG/L (ref 0–8)
ERYTHROCYTE [SEDIMENTATION RATE] IN BLOOD BY WESTERGREN METHOD: 10 MM/H (ref 0–30)
HBA1C MFR BLD: 5.5 % (ref 4.3–6)

## 2018-02-09 PROCEDURE — 80061 LIPID PANEL: CPT | Performed by: FAMILY MEDICINE

## 2018-02-09 PROCEDURE — 82306 VITAMIN D 25 HYDROXY: CPT | Performed by: FAMILY MEDICINE

## 2018-02-09 PROCEDURE — 80053 COMPREHEN METABOLIC PANEL: CPT | Performed by: FAMILY MEDICINE

## 2018-02-09 PROCEDURE — 36415 COLL VENOUS BLD VENIPUNCTURE: CPT | Performed by: FAMILY MEDICINE

## 2018-02-09 PROCEDURE — 86140 C-REACTIVE PROTEIN: CPT | Performed by: FAMILY MEDICINE

## 2018-02-09 PROCEDURE — 99396 PREV VISIT EST AGE 40-64: CPT | Performed by: FAMILY MEDICINE

## 2018-02-09 PROCEDURE — 85652 RBC SED RATE AUTOMATED: CPT | Performed by: FAMILY MEDICINE

## 2018-02-09 PROCEDURE — 99213 OFFICE O/P EST LOW 20 MIN: CPT | Mod: 25 | Performed by: FAMILY MEDICINE

## 2018-02-09 PROCEDURE — 83036 HEMOGLOBIN GLYCOSYLATED A1C: CPT | Performed by: FAMILY MEDICINE

## 2018-02-09 NOTE — NURSING NOTE
"Chief Complaint   Patient presents with     Physical     initial /84 (BP Location: Left arm, Cuff Size: Adult Regular)  Pulse 86  Temp 97  F (36.1  C) (Oral)  Resp 16  Ht 5' 1.75\" (1.568 m)  Wt 126 lb (57.2 kg)  SpO2 99%  BMI 23.23 kg/m2 Estimated body mass index is 23.23 kg/(m^2) as calculated from the following:    Height as of this encounter: 5' 1.75\" (1.568 m).    Weight as of this encounter: 126 lb (57.2 kg).  BP completed using cuff size: regular.  L  arm      Health Maintenance that is potentially due pending provider review:  Colonoscopy/FIT    n/a    Dimas Gaviria ma  "

## 2018-02-09 NOTE — PROGRESS NOTES
SUBJECTIVE:   CC: Shanae Giang is an 62 year old woman who presents for preventive health visit.     Healthy Habits:  [facility]  Answers for HPI/ROS submitted by the patient on 2/8/2018   Annual Exam:  Getting at least 3 servings of Calcium per day:: Yes  Bi-annual eye exam:: Yes  Dental care twice a year:: Yes  Sleep apnea or symptoms of sleep apnea:: None  Diet:: Regular (no restrictions)  Frequency of exercise:: 2-3 days/week  Taking medications regularly:: No  Medication side effects:: Muscle aches  Additional concerns today:: YES  PHQ-2 Score: 0  Duration of exercise:: 15-30 minutes  Barriers to taking medications:: Side effects          PROBLEMS TO ADD ON...  1) hyperlipidemia , she has been on the lipitor at 10mg daily dose but noticed her thighs were getting cramps at night and she started tracking when she would get the cramps and started skipping on the Lipitor , seems that they correlate -taking the lipitor and then getting the thigh cramps , so she has stopped taking it for a while then only took half once a few days , so not consistent on it , would like to check her lipids today but knows that they are probably high   2) PMR , she was diagned with this a few yrs ago and then she was placed on prednisone by a rheumatologist , she was on that dose for 6 months and then started to slowly taper down , she has been off the prednisone for over a yr but noticed recently that she would get some pains in her upper thighs not as bad as before but still worried that the PMr could be coming back   3) FH of DM type 2 , she has not changed her weight , no polyuria , no polydipsia etc but wants to check her Hgb A 1 c today , she is also fasting for lipids check     Today's PHQ-2 Score:   PHQ-2 ( 1999 Pf[employer]) 2/8/2018 1/29/2017   Q1: Little interest or pleasure in doing things 0 -   Q2: Feeling down, depressed or hopeless 0 -   PHQ-2 Score 0 -   Q1: Little interest or pleasure in doing things Not at  all Not at all   Q2: Feeling down, depressed or hopeless Not at all Not at all   PHQ-2 Score 0 0       Abuse: Current or Past(Physical, Sexual or Emotional)- No  Do you feel safe in your environment - Yes    Social History   Substance Use Topics     Smoking status: Never Smoker     Smokeless tobacco: Never Used     Alcohol use 0.0 - 0.6 oz/week     0 - 1 Standard drinks or equivalent per week      Comment: Very Rarely-3 a year     If you drink alcohol do you typically have >3 drinks per day or >7 drinks per week? No                     Reviewed orders with patient.  Reviewed health maintenance and updated orders accordingly - Yes  Labs reviewed in [facility]    Patient over age 50, mutual decision to screen reflected in health maintenance.    Pertinent mammograms are reviewed under the imaging tab.  History of abnormal Pap smear: NO - age 30- 65 PAP every 3 years recommended    Reviewed and updated as needed this visit by clinical staff  Tobacco         Reviewed and updated as needed this visit by Provider        Past Medical History:   Diagnosis Date     Disorder of bone and cartilage, unspecified     Osteopenia- dietary calcium, mvi     Mild intermittent asthma     sx's only with uri's, then uses albut neb     Osteoarthrosis, unspecified whether generalized or localized, other specified sites     Right hip arthritis     Osteoarthrosis, unspecified whether generalized or localized, pelvic region and thigh     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      Past Surgical History:   Procedure Laterality Date     C NONSPECIFIC PROCEDURE  09-09-98    s/p L hip replacement     C NONSPECIFIC PROCEDURE  Age 6/7    Rotational osteotomy of Left hip       ROS:  C: NEGATIVE for fever, chills, change in weight  I: NEGATIVE for worrisome rashes, moles or lesions  E: NEGATIVE for vision changes or irritation  ENT: NEGATIVE for ear, mouth and throat problems  R: NEGATIVE for significant cough or SOB  B: NEGATIVE for masses, tenderness or  "discharge  CV: NEGATIVE for chest pain, palpitations or peripheral edema  GI: NEGATIVE for nausea, abdominal pain, heartburn, or change in bowel habits  : NEGATIVE for unusual urinary or vaginal symptoms. No vaginal bleeding.  M: NEGATIVE for significant arthralgias or myalgia  N: NEGATIVE for weakness, dizziness or paresthesias  P: NEGATIVE for changes in mood or affect     OBJECTIVE:   /84 (BP Location: Left arm, Cuff Size: Adult Regular)  Pulse 86  Temp 97  F (36.1  C) (Oral)  Resp 16  Ht 5' 1.75\" (1.568 m)  Wt 126 lb (57.2 kg)  SpO2 99%  BMI 23.23 kg/m2  EXAM:  GENERAL: healthy, alert and no distress  EYES: Eyes grossly normal to inspection, PERRL and conjunctivae and sclerae normal  HENT: ear canals and TM's normal, nose and mouth without ulcers or lesions  NECK: no adenopathy, no asymmetry, masses, or scars and thyroid normal to palpation  RESP: lungs clear to auscultation - no rales, rhonchi or wheezes  BREAST: normal without masses, tenderness or nipple discharge and no palpable axillary masses or adenopathy  CV: regular rate and rhythm, normal S1 S2, no S3 or S4, no murmur, click or rub, no peripheral edema and peripheral pulses strong  ABDOMEN: soft, nontender, no hepatosplenomegaly, no masses and bowel sounds normal  MS: no gross musculoskeletal defects noted, no edema  SKIN: no suspicious lesions or rashes  NEURO: Normal strength and tone, mentation intact and speech normal  PSYCH: mentation appears normal, affect normal/bright    ASSESSMENT/PLAN:   1. Encounter for routine adult health examination without abnormal findings  Discussed diet,calcium,exercise.Went over self breast exam.Thin prep was NOT done.Eyes and teeth UTD.No immunizations needed today.See orders below for tests ordered and screening needed.    - Fecal colorectal cancer screen (FIT); Future  - DX Hip/Pelvis/Spine; Future  - Vitamin D Deficiency    2. Hyperlipidemia LDL goal <130  sheis on Lipitor 10mg and last lipids " "results are from a year ago , will need to check lipids and also CMP today , she denies any side effects with the lipitor   - Lipid Profile (Chol, Trig, HDL, LDL calc)  - Comprehensive metabolic panel (BMP + Alb, Alk Phos, ALT, AST, Total. Bili, TP)    3. PMR (polymyalgia rheumatica) (H)  She was diagnosed with PMR a few yrs ago and saw rheumatology Dr Quevedo , her last CRP from January 2017 was normal but she states that now she has been having some muscle aches in the upper thighs so worried it might be coming back , wants to check CRP and ESR today   - CRP, inflammation  - ESR: Erythrocyte sedimentation rate    4. Family history of diabetes mellitus  Has a strong FH of Dm type 2 and she needs to check her HGb A 1 c today , she denies any polyuria no polydipsia or changes in her weight,  - Hemoglobin A1c    COUNSELING:   Reviewed preventive health counseling, as reflected in patient instructions       Regular exercise       Healthy diet/nutrition       Vision screening       Hearing screening       Osteoporosis Prevention/Bone Health         reports that she has never smoked. She has never used smokeless tobacco.    Estimated body mass index is 23.23 kg/(m^2) as calculated from the following:    Height as of this encounter: 5' 1.75\" (1.568 m).    Weight as of this encounter: 126 lb (57.2 kg).       Counseling Resources:  ATP IV Guidelines  Pooled Cohorts Equation Calculator  Breast Cancer Risk Calculator  FRAX Risk Assessment  ICSI Preventive Guidelines  Dietary Guidelines for Americans, 2010  USDA's MyPlate  ASA Prophylaxis  Lung CA Screening    Shona Jasso MD    "

## 2018-02-10 LAB
ALBUMIN SERPL-MCNC: 4.2 G/DL (ref 3.4–5)
ALP SERPL-CCNC: 71 U/L (ref 40–150)
ALT SERPL W P-5'-P-CCNC: 33 U/L (ref 0–50)
ANION GAP SERPL CALCULATED.3IONS-SCNC: 9 MMOL/L (ref 3–14)
AST SERPL W P-5'-P-CCNC: 21 U/L (ref 0–45)
BILIRUB SERPL-MCNC: 0.4 MG/DL (ref 0.2–1.3)
BUN SERPL-MCNC: 16 MG/DL (ref 7–30)
CALCIUM SERPL-MCNC: 9.8 MG/DL (ref 8.5–10.1)
CHLORIDE SERPL-SCNC: 104 MMOL/L (ref 94–109)
CHOLEST SERPL-MCNC: 183 MG/DL
CO2 SERPL-SCNC: 29 MMOL/L (ref 20–32)
CREAT SERPL-MCNC: 0.69 MG/DL (ref 0.52–1.04)
GFR SERPL CREATININE-BSD FRML MDRD: 86 ML/MIN/1.7M2
GLUCOSE SERPL-MCNC: 109 MG/DL (ref 70–99)
HDLC SERPL-MCNC: 55 MG/DL
LDLC SERPL CALC-MCNC: 112 MG/DL
NONHDLC SERPL-MCNC: 128 MG/DL
POTASSIUM SERPL-SCNC: 4.2 MMOL/L (ref 3.4–5.3)
PROT SERPL-MCNC: 7.7 G/DL (ref 6.8–8.8)
SODIUM SERPL-SCNC: 142 MMOL/L (ref 133–144)
TRIGL SERPL-MCNC: 82 MG/DL

## 2018-02-12 LAB — DEPRECATED CALCIDIOL+CALCIFEROL SERPL-MC: 37 UG/L (ref 20–75)

## 2018-02-13 ENCOUNTER — HOSPITAL ENCOUNTER (OUTPATIENT)
Dept: BONE DENSITY | Facility: CLINIC | Age: 63
Discharge: HOME | End: 2018-02-13
Attending: FAMILY MEDICINE | Admitting: FAMILY MEDICINE
Payer: COMMERCIAL

## 2018-02-13 DIAGNOSIS — Z00.00 ENCOUNTER FOR ROUTINE ADULT HEALTH EXAMINATION WITHOUT ABNORMAL FINDINGS: ICD-10-CM

## 2018-02-13 PROCEDURE — 77080 DXA BONE DENSITY AXIAL: CPT

## 2018-02-16 ENCOUNTER — TELEPHONE (OUTPATIENT)
Dept: FAMILY MEDICINE | Facility: CLINIC | Age: 63
End: 2018-02-16

## 2018-02-16 NOTE — TELEPHONE ENCOUNTER
Reason for Call:  Other call back    Detailed comments: pt would like to speak with nurse in regards to come results. Please call pt back asap    Phone Number Patient can be reached at: [phone]    Best Time: anytime    Can we leave a detailed message on this number? YES    Call taken on 2/16/2018 at 1:55 PM by Lillian Diaz

## 2018-02-16 NOTE — TELEPHONE ENCOUNTER
LS,  Patient wants your advise on DEXA result and lab results  Regarding cholesterol, states she was taking Atorvastatin about 3rd of the time when she had lipids checked 2/9/2018  States it caused her some leg cramping - felt better when she was not constantly on the Atorvastatin  Pt wondering if she can be off of statin for a while and see if numbers remain good  Otherwise ok with switching to Crestor as you mentioned at OV if needed  Has lost 10 pounds since last lipid panel which she thinks improved result  Please advise.  Tonja MARMOLEJO RN

## 2018-02-16 NOTE — TELEPHONE ENCOUNTER
Her labs are normal , you can let her know that   If she wants to quit taking Lipitor that is fine , we would need to recheck in 6 to 8 weeks after she does that   The DEXA scan shows unchanged osteopenia , meaning she does not have osteoporosis but should continue to take calcium at 500mg twice a day and Vit D at 2000 UI daily   Thanks

## 2018-02-23 DIAGNOSIS — Z00.00 ENCOUNTER FOR ROUTINE ADULT HEALTH EXAMINATION WITHOUT ABNORMAL FINDINGS: ICD-10-CM

## 2018-02-23 PROCEDURE — 82274 ASSAY TEST FOR BLOOD FECAL: CPT | Performed by: FAMILY MEDICINE

## 2018-02-27 LAB — HEMOCCULT STL QL IA: NEGATIVE

## 2018-04-03 ENCOUNTER — OFFICE VISIT (OUTPATIENT)
Dept: FAMILY MEDICINE | Facility: CLINIC | Age: 63
End: 2018-04-03
Payer: COMMERCIAL

## 2018-04-03 VITALS
DIASTOLIC BLOOD PRESSURE: 94 MMHG | BODY MASS INDEX: 23.1 KG/M2 | SYSTOLIC BLOOD PRESSURE: 141 MMHG | HEART RATE: 97 BPM | WEIGHT: 125.5 LBS | OXYGEN SATURATION: 98 % | HEIGHT: 62 IN | TEMPERATURE: 98.1 F

## 2018-04-03 DIAGNOSIS — B35.4 TINEA CORPORIS: Primary | ICD-10-CM

## 2018-04-03 DIAGNOSIS — E78.5 HYPERLIPIDEMIA LDL GOAL <130: ICD-10-CM

## 2018-04-03 DIAGNOSIS — R03.0 ELEVATED BLOOD PRESSURE READING WITHOUT DIAGNOSIS OF HYPERTENSION: ICD-10-CM

## 2018-04-03 PROCEDURE — 99214 OFFICE O/P EST MOD 30 MIN: CPT | Performed by: FAMILY MEDICINE

## 2018-04-03 RX ORDER — CICLOPIROX OLAMINE 7.7 MG/G
CREAM TOPICAL 2 TIMES DAILY
Qty: 30 G | Refills: 1 | Status: SHIPPED | OUTPATIENT
Start: 2018-04-03 | End: 2021-03-10

## 2018-04-03 NOTE — MR AVS SNAPSHOT
After Visit Summary   4/3/2018    Shanae Giang    MRN: [id number]           Patient Information     Date Of Birth          1955        Visit Information        Provider Department      4/3/2018 12:00 PM Sia Melendez MD [facility]        Today's Diagnoses     Tinea corporis    -  1    Elevated blood pressure reading without diagnosis of hypertension          Care Instructions      Ringworm of the Skin    Ringworm is a fungal infection of the skin. Despite the name, a worm doesn't cause it. The cause of ringworm is a fungus that infects the outer layers of the skin. It is also not caused by bed bugs, scabies, or lice. These are totally different.  The medical term for ringworm is tinea. It can affect most parts of your body, although it seems to do better in moist areas of the body and around hair. It can be on almost any part of your body, including:    Arms, hands, legs, chest, feet, and back    Scalp    Beard    Groin    Between the toes  Depending on where it is located, sometimes the name changes:    Tinea capitis (scalp)    Tinea cruris (groin)    Tinea corporis (body)    Tinea pedis (feet)  Causes  Ringworm is very common all over the world, including the [place] It can take less than 1 week up to 2 weeks before you develop the infection after being exposed. So, you may not figure out the exact cause.  It is spread through direct contact with:    An infected person or animal    Infected soil, or objects such as towels, clothing, and alfonso  Symptoms  At first you might not notice ringworm. Or you may just see a small, red, often raised itchy spot or pimple. Sometimes there may only be one spot. At other times there may be several. Ringworm can look slightly different on different parts of the body, but there are some things are always present:    Irregular, round, oval or ring-shaped, which is why it's called ringworm    Clearer or lighter color at the center, since it  spreads from the center of the spot outward    Red or inflamed look    Raised    Itchy    Scaly, dry, or flaky  Home care  Follow these tips to help care for yourself at home:    Leave it alone. Don't scratch at the rash or pick it. This can increase the chance of infection and scarring.    Take medicine as prescribed. If you were prescribed a cream, apply it exactly as directed. Make sure to put the cream not just on the rash, but also on the skin 1 or 2 inches around it. Medicine by mouth is sometimes needed, particularly for ringworm on the scalp. Take it as directed and until your healthcare provider says to stop.    Keep it from spreading to others. Untreated ringworm of the skin is contagious by skin-to-skin contact. Your child may return to school 2 days after treatment has started.  Prevention  To some degree, prevention depends on what part of your body was affected. In general, the following good hygiene can help.    Clean up after you get dirty or sweaty, or after using a locker room.    When possible, don t share alfonso and brushes.    Avoid having your skin and feet wet or damp for long periods.    Wear clean, loose-fitting underwear.  Follow-up care  Follow up with your healthcare provider as advised by our staff if the rash does not improve after 10 days of treatment or if the rash spreads to other areas of the body.  When to seek medical advice  Call your healthcare provider right away if any of these occur:    Redness around the rash gets worse    Fluid drains from the rash    Fever of 100.4 F (38 C) or higher, or as directed by your healthcare provider  Date Last Reviewed: 8/1/2016 2000-2017 [employer]. [address] [place] [address], [place] [address]. All rights reserved. This information is not intended as a substitute for professional medical care. Always follow your healthcare professional's instructions.                Follow-ups after your visit        Who to contact     If you have  "questions or need follow up information about today's clinic visit or your schedule please contact [facility] directly at [phone].  Normal or non-critical lab and imaging results will be communicated to you by iCrederityhart, letter or phone within 4 business days after the clinic has received the results. If you do not hear from us within 7 days, please contact the clinic through iCrederityhart or phone. If you have a critical or abnormal lab result, we will notify you by phone as soon as possible.  Submit refill requests through Collabspot or call your pharmacy and they will forward the refill request to us. Please allow 3 business days for your refill to be completed.          Additional Information About Your Visit        iCrederityhar[employer] Information     Collabspot gives you secure access to your electronic health record. If you see a primary care provider, you can also send messages to your care team and make appointments. If you have questions, please call your primary care clinic.  If you do not have a primary care provider, please call [phone] and they will assist you.        Care EveryWhere ID     This is your Care EveryWhere ID. This could be used by other organizations to access your [facility] medical records  [id number]        Your Vitals Were     Pulse Temperature Height Pulse Oximetry BMI (Body Mass Index)       97 98.1  F (36.7  C) (Oral) 5' 1.75\" (1.568 m) 98% 23.14 kg/m2        Blood Pressure from Last 3 Encounters:   04/03/18 (!) 141/94   02/09/18 139/81   01/31/17 126/74    Weight from Last 3 Encounters:   04/03/18 125 lb 8 oz (56.9 kg)   02/09/18 126 lb (57.2 kg)   01/31/17 123 lb (55.8 kg)              Today, you had the following     No orders found for display         Today's Medication Changes          These changes are accurate as of 4/3/18 12:43 PM.  If you have any questions, ask your nurse or doctor.               Start taking these medicines.        Dose/Directions    ciclopirox 0.77 % cream "   Commonly known as:  LOPROX   Used for:  Tinea corporis   Started by:  Sia Melendez MD        Apply topically 2 times daily   Quantity:  30 g   Refills:  1            Where to get your medicines      These medications were sent to [id number] - [place], [place] - [address] [facility] [address]  [address], [place] [address]     Phone:  [phone]     ciclopirox 0.77 % cream                Primary Care Provider Office Phone # Fax #    Shona Jasso [phone] [address] [facility]   [facility] [address]        Equal Access to Services     [facility] HospitalYADIRA : Hadii aad ku hadasho Soomaali, waaxda luqadaha, qaybta kaalmada adeegyada, nelson cano . So [facility] [phone].    ATENCIÓN: Si habla español, tiene a chatman disposición servicios gratuitos de asistencia lingüística. Llame al [phone].    We comply with applicable federal civil rights laws and [place] laws. We do not discriminate on the basis of race, color, national origin, age, disability, sex, sexual orientation, or gender identity.            Thank you!     Thank you for choosing [facility]  for your care. Our goal is always to provide you with excellent care. Hearing back from our patients is one way we can continue to improve our services. Please take a few minutes to complete the written survey that you may receive in the mail after your visit with us. Thank you!             Your Updated Medication List - Protect others around you: Learn how to safely use, store and throw away your medicines at www.disposemymeds.org.          This list is accurate as of 4/3/18 12:43 PM.  Always use your most recent med list.                   Brand Name Dispense Instructions for use Diagnosis    atorvastatin 10 MG tablet    LIPITOR    90 tablet    Take 1 tablet (10 mg) by mouth daily    Hyperlipidemia LDL goal <130       ciclopirox 0.77 % cream    LOPROX    30 g    Apply  topically 2 times daily    Tinea corporis       glucosamine-chondroitin 500-400 MG Caps per capsule      Take 1 capsule by mouth 2 times daily        MULTIVITAMIN TABS   OR      TAKE ONE TABLET BY MOUTH DAILY        TYLENOL PO      Take 500 mg by mouth every 6 hours as needed for mild pain or fever        UNABLE TO FIND      Take 1 tablet by mouth daily MEDICATION NAME: Tumeric        VITAMIN D (CHOLECALCIFEROL) PO      Take 1,000 Units by mouth daily

## 2018-04-03 NOTE — PATIENT INSTRUCTIONS
Ringworm of the Skin    Ringworm is a fungal infection of the skin. Despite the name, a worm doesn't cause it. The cause of ringworm is a fungus that infects the outer layers of the skin. It is also not caused by bed bugs, scabies, or lice. These are totally different.  The medical term for ringworm is tinea. It can affect most parts of your body, although it seems to do better in moist areas of the body and around hair. It can be on almost any part of your body, including:    Arms, hands, legs, chest, feet, and back    Scalp    Beard    Groin    Between the toes  Depending on where it is located, sometimes the name changes:    Tinea capitis (scalp)    Tinea cruris (groin)    Tinea corporis (body)    Tinea pedis (feet)  Causes  Ringworm is very common all over the world, including the [place] It can take less than 1 week up to 2 weeks before you develop the infection after being exposed. So, you may not figure out the exact cause.  It is spread through direct contact with:    An infected person or animal    Infected soil, or objects such as towels, clothing, and alfonso  Symptoms  At first you might not notice ringworm. Or you may just see a small, red, often raised itchy spot or pimple. Sometimes there may only be one spot. At other times there may be several. Ringworm can look slightly different on different parts of the body, but there are some things are always present:    Irregular, round, oval or ring-shaped, which is why it's called ringworm    Clearer or lighter color at the center, since it spreads from the center of the spot outward    Red or inflamed look    Raised    Itchy    Scaly, dry, or flaky  Home care  Follow these tips to help care for yourself at home:    Leave it alone. Don't scratch at the rash or pick it. This can increase the chance of infection and scarring.    Take medicine as prescribed. If you were prescribed a cream, apply it exactly as directed. Make sure to put the cream not just on the  rash, but also on the skin 1 or 2 inches around it. Medicine by mouth is sometimes needed, particularly for ringworm on the scalp. Take it as directed and until your healthcare provider says to stop.    Keep it from spreading to others. Untreated ringworm of the skin is contagious by skin-to-skin contact. Your child may return to school 2 days after treatment has started.  Prevention  To some degree, prevention depends on what part of your body was affected. In general, the following good hygiene can help.    Clean up after you get dirty or sweaty, or after using a locker room.    When possible, don t share alfonso and brushes.    Avoid having your skin and feet wet or damp for long periods.    Wear clean, loose-fitting underwear.  Follow-up care  Follow up with your healthcare provider as advised by our staff if the rash does not improve after 10 days of treatment or if the rash spreads to other areas of the body.  When to seek medical advice  Call your healthcare provider right away if any of these occur:    Redness around the rash gets worse    Fluid drains from the rash    Fever of 100.4 F (38 C) or higher, or as directed by your healthcare provider  Date Last Reviewed: 8/1/2016 2000-2017 [employer]. [address] [place] [address], [place] [address]. All rights reserved. This information is not intended as a substitute for professional medical care. Always follow your healthcare professional's instructions.

## 2018-04-03 NOTE — PROGRESS NOTES
SUBJECTIVE:   hSanae Giang is a 62 year old female who presents to clinic today for the following health issues:      Rash      Duration: x2 months    Description  Location: above LT buttock and LT buttock near anus   Itching: no    Intensity:  Mild to moderate     Accompanying signs and symptoms: red and circular, dry patch in the middle     History (similar episodes/previous evaluation): ringworm in the past     Precipitating or alleviating factors:  New exposures:  None  Recent travel: no      Therapies tried and outcome: hydrocortisone cream -  not effective, antifungal - not effective - terbinafine- lightened the redness but still there  Review of systems; she report Blood pressure always high in office, because of 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    Problem list and histories reviewed & adjusted, as indicated.  Additional history: as documented    Patient Active Problem List   Diagnosis     Osteoarthrosis, unspecified whether generalized or localized, pelvic region and thigh     Disorder of bone and cartilage     HYPERLIPIDEMIA LDL GOAL <130     Elevated C-reactive protein (CRP)     PMR (polymyalgia rheumatica) (H)     Osteopenia     Tinea corporis     Past Surgical History:   Procedure Laterality Date     C NONSPECIFIC PROCEDURE  09-09-98    s/p L hip replacement     C NONSPECIFIC PROCEDURE  Age 4/7    Rotational osteotomy of Left hip       Social History   Substance Use Topics     Smoking status: Never Smoker     Smokeless tobacco: Never Used     Alcohol use 0.0 - 0.6 oz/week     0 - 1 Standard drinks or equivalent per week      Comment: Very Rarely-3 a year     Family History   Problem Relation Age of Onset     C.A.D. Maternal Grandmother      CHF     C.A.D. Maternal Grandfather      Had heart surgery     HEART DISEASE Mother      CHF     HEART DISEASE Maternal Grandfather      MI- first at age 30,  " of one in late 50's     HEART DISEASE Paternal Grandmother      ?     Hypertension Mother      Hypertension Maternal Grandmother      Hypertension Brother      Alcohol/Drug Father      Alcohol/Drug Paternal Grandmother      Alcohol/Drug Sister      Alcohol/Drug Brother      Depression Father      Depression Sister      GASTROINTESTINAL DISEASE Father      Had to have part of stomach removed-many ulcers     GASTROINTESTINAL DISEASE Sister      Esophageal Reflux, ulcers     Psychotic Disorder Sister      Psychotic Disorder Sister      Depression Brother            Reviewed and updated as needed this visit by clinical staff       Reviewed and updated as needed this visit by Provider         ROS:  Constitutional, HEENT, cardiovascular, pulmonary, gi and gu systems are negative, except as otherwise noted.    OBJECTIVE:     BP (!) 141/94  Pulse 97  Temp 98.1  F (36.7  C) (Oral)  Ht 5' 1.75\" (1.568 m)  Wt 125 lb 8 oz (56.9 kg)  SpO2 98%  BMI 23.14 kg/m2  Body mass index is 23.14 kg/(m^2).  GENERAL: healthy, alert and no distress  SKIN: Examination of the rash on left lumbar region shows  :large oval shaped  ring with dry scaly erythematous rash with well demarcated boundary, there are two additional patch on left gluteal region near the anus       ASSESSMENT/PLAN:   (B35.4) Tinea corporis  (primary encounter diagnosis)  Comment:   Plan: ciclopirox (LOPROX) 0.77 % cream twice daily for 4 weeks  Follow up if not better  Discussed punch biopsy in future  Follow up earlier if rash worsening             (R03.0) Elevated blood pressure reading without diagnosis of hypertension  Comment:   Plan: encouraged low salt diet and follow up with primary care physicain     (E78.5) Hyperlipidemia LDL goal <130  Plan: She has plans to follow up with repeat fasting lipid - in a month with primary care physicain   Statins stopped about a month ago   The 10-year ASCVD risk score (Devora DAYANA Jr, et al., 2013) is: 5.7%    Values used to " calculate the score:      Age: 62 years      Sex: Female      Is Non- : No      Diabetic: No      Tobacco smoker: No      Systolic Blood Pressure: 141 mmHg      Is BP treated: No      HDL Cholesterol: 55 mg/dL      Total Cholesterol: 183 mg/dL    Potential medication side effects were discussed with the patient; let me know if any occur.  The patient indicates understanding of these issues and agrees with the plan.    Sia Melendez MD  [facility]

## 2018-04-03 NOTE — NURSING NOTE
"Chief Complaint   Patient presents with     Derm Problem       Initial BP (!) 141/94  Pulse 97  Temp 98.1  F (36.7  C) (Oral)  Ht 5' 1.75\" (1.568 m)  Wt 125 lb 8 oz (56.9 kg)  SpO2 98%  BMI 23.14 kg/m2 Estimated body mass index is 23.14 kg/(m^2) as calculated from the following:    Height as of this encounter: 5' 1.75\" (1.568 m).    Weight as of this encounter: 125 lb 8 oz (56.9 kg).  Medication Reconciliation: complete      Health Maintenance that is potentially due pending provider review:  NONE    n/a    JESS Willard  "

## 2018-05-01 ENCOUNTER — OFFICE VISIT (OUTPATIENT)
Dept: FAMILY MEDICINE | Facility: CLINIC | Age: 63
End: 2018-05-01
Payer: COMMERCIAL

## 2018-05-01 VITALS
RESPIRATION RATE: 16 BRPM | HEART RATE: 99 BPM | OXYGEN SATURATION: 98 % | DIASTOLIC BLOOD PRESSURE: 80 MMHG | HEIGHT: 62 IN | BODY MASS INDEX: 23.19 KG/M2 | SYSTOLIC BLOOD PRESSURE: 120 MMHG | TEMPERATURE: 97.8 F | WEIGHT: 126 LBS

## 2018-05-01 DIAGNOSIS — R03.0 ELEVATED BLOOD PRESSURE READING WITHOUT DIAGNOSIS OF HYPERTENSION: ICD-10-CM

## 2018-05-01 DIAGNOSIS — E78.00 PURE HYPERCHOLESTEROLEMIA: ICD-10-CM

## 2018-05-01 DIAGNOSIS — R21 RASH: ICD-10-CM

## 2018-05-01 DIAGNOSIS — M35.3 PMR (POLYMYALGIA RHEUMATICA) (H): ICD-10-CM

## 2018-05-01 DIAGNOSIS — E78.5 HYPERLIPIDEMIA LDL GOAL <130: Primary | ICD-10-CM

## 2018-05-01 LAB
ALBUMIN SERPL-MCNC: 4 G/DL (ref 3.4–5)
ALP SERPL-CCNC: 67 U/L (ref 40–150)
ALT SERPL W P-5'-P-CCNC: 48 U/L (ref 0–50)
ANION GAP SERPL CALCULATED.3IONS-SCNC: 11 MMOL/L (ref 3–14)
AST SERPL W P-5'-P-CCNC: 21 U/L (ref 0–45)
BILIRUB SERPL-MCNC: 0.4 MG/DL (ref 0.2–1.3)
BUN SERPL-MCNC: 17 MG/DL (ref 7–30)
CALCIUM SERPL-MCNC: 9.4 MG/DL (ref 8.5–10.1)
CHLORIDE SERPL-SCNC: 106 MMOL/L (ref 94–109)
CHOLEST SERPL-MCNC: 263 MG/DL
CO2 SERPL-SCNC: 23 MMOL/L (ref 20–32)
CREAT SERPL-MCNC: 0.68 MG/DL (ref 0.52–1.04)
CRP SERPL-MCNC: <2.9 MG/L (ref 0–8)
ERYTHROCYTE [SEDIMENTATION RATE] IN BLOOD BY WESTERGREN METHOD: 13 MM/H (ref 0–30)
GFR SERPL CREATININE-BSD FRML MDRD: 87 ML/MIN/1.7M2
GLUCOSE SERPL-MCNC: 108 MG/DL (ref 70–99)
HBA1C MFR BLD: 5.5 % (ref 0–5.6)
HDLC SERPL-MCNC: 50 MG/DL
LDLC SERPL CALC-MCNC: 185 MG/DL
NONHDLC SERPL-MCNC: 213 MG/DL
POTASSIUM SERPL-SCNC: 3.9 MMOL/L (ref 3.4–5.3)
PROT SERPL-MCNC: 7.7 G/DL (ref 6.8–8.8)
SODIUM SERPL-SCNC: 140 MMOL/L (ref 133–144)
TRIGL SERPL-MCNC: 138 MG/DL

## 2018-05-01 PROCEDURE — 99214 OFFICE O/P EST MOD 30 MIN: CPT | Performed by: FAMILY MEDICINE

## 2018-05-01 PROCEDURE — 85652 RBC SED RATE AUTOMATED: CPT | Performed by: FAMILY MEDICINE

## 2018-05-01 PROCEDURE — 80061 LIPID PANEL: CPT | Performed by: FAMILY MEDICINE

## 2018-05-01 PROCEDURE — 80053 COMPREHEN METABOLIC PANEL: CPT | Performed by: FAMILY MEDICINE

## 2018-05-01 PROCEDURE — 86140 C-REACTIVE PROTEIN: CPT | Performed by: FAMILY MEDICINE

## 2018-05-01 PROCEDURE — 36415 COLL VENOUS BLD VENIPUNCTURE: CPT | Performed by: FAMILY MEDICINE

## 2018-05-01 PROCEDURE — 83036 HEMOGLOBIN GLYCOSYLATED A1C: CPT | Performed by: FAMILY MEDICINE

## 2018-05-01 NOTE — NURSING NOTE
"Chief Complaint   Patient presents with     Hyperlipidemia       Initial /85 (BP Location: Left arm, Patient Position: Sitting, Cuff Size: Adult Regular)  Pulse 99  Temp 97.8  F (36.6  C) (Oral)  Resp 16  Ht 5' 2\" (1.575 m)  Wt 126 lb (57.2 kg)  SpO2 98%  Breastfeeding? No  BMI 23.05 kg/m2 Estimated body mass index is 23.05 kg/(m^2) as calculated from the following:    Height as of this encounter: 5' 2\" (1.575 m).    Weight as of this encounter: 126 lb (57.2 kg).  Medication Reconciliation: complete.  WILSON Sanchez      "

## 2018-05-01 NOTE — PROGRESS NOTES
SUBJECTIVE:   Shanae Giang is a 62 year old female who presents to clinic today for the following health issues:      Hyperlipidemia Follow-Up- she was on Lipitor 20mg for a couple of years but recently thought it was giving her cramps in the thighs , she was taking it q other dy for a month and then stopped it completely three months ago, here to check her lipids now       Rate your low fat/cholesterol diet?: fair    Taking statin?  No (patiemt is concern as her blood pressure has been high and wonder is this is related to stopping her statin)    Other lipid medications/supplements?:  none      Amount of exercise or physical activity: None    Problems taking medications regularly: No    Medication side effects: muscle aches from pervious statin medication     Diet: low fat/cholesterol and low salt     2) PMR , in that past she has had to take daily prednisone - she has been off it for a year and here to recheck her ESR and CRP as she is not sure if those thigh aches are related to Lipitor or if she is starting to have issues with the PMR again   3) rash , has had fine red papules on her neck and upper chest on and off not sure what causes it   4) elevated fasting  glucose in the past and here to check it again as she is fasting       Problem list and histories reviewed & adjusted, as indicated.  Additional history: as documented    Patient Active Problem List   Diagnosis     Osteoarthrosis, unspecified whether generalized or localized, pelvic region and thigh     Disorder of bone and cartilage     HYPERLIPIDEMIA LDL GOAL <130     Elevated C-reactive protein (CRP)     PMR (polymyalgia rheumatica) (H)     Osteopenia     Tinea corporis     Past Surgical History:   Procedure Laterality Date     C NONSPECIFIC PROCEDURE  09-09-98    s/p L hip replacement     C NONSPECIFIC PROCEDURE  Age 4/7    Rotational osteotomy of Left hip       Social History   Substance Use Topics     Smoking status: Never Smoker     Smokeless  tobacco: Never Used     Alcohol use 0.0 - 0.6 oz/week     0 - 1 Standard drinks or equivalent per week      Comment: Very Rarely-3 a year     Family History   Problem Relation Age of Onset     C.A.D. Maternal Grandmother      CHF     C.A.D. Maternal Grandfather      Had heart surgery     HEART DISEASE Mother      CHF     HEART DISEASE Maternal Grandfather      MI- first at age 30,  of one in late 50's     HEART DISEASE Paternal Grandmother      ?     Hypertension Mother      Hypertension Maternal Grandmother      Hypertension Brother      Alcohol/Drug Father      Alcohol/Drug Paternal Grandmother      Alcohol/Drug Sister      Alcohol/Drug Brother      Depression Father      Depression Sister      GASTROINTESTINAL DISEASE Father      Had to have part of stomach removed-many ulcers     GASTROINTESTINAL DISEASE Sister      Esophageal Reflux, ulcers     Psychotic Disorder Sister      Psychotic Disorder Sister      Depression Brother          Current Outpatient Prescriptions   Medication Sig Dispense Refill     Acetaminophen (TYLENOL PO) Take 500 mg by mouth every 6 hours as needed for mild pain or fever       atorvastatin (LIPITOR) 10 MG tablet Take 1 tablet (10 mg) by mouth daily (Patient not taking: Reported on 2018) 90 tablet 3     atorvastatin (LIPITOR) 20 MG tablet Take 1 tablet (20 mg) by mouth daily 30 tablet 5     ciclopirox (LOPROX) 0.77 % cream Apply topically 2 times daily 30 g 1     glucosamine-chondroitin 500-400 MG CAPS Take 1 capsule by mouth 2 times daily        MULTIVITAMIN TABS   OR TAKE ONE TABLET BY MOUTH DAILY (Patient not taking: No sig reported)       rosuvastatin (CRESTOR) 5 MG tablet Take 1 tablet (5 mg) by mouth daily 30 tablet 1     UNABLE TO FIND Take 1 tablet by mouth daily MEDICATION NAME: Tumeric       VITAMIN D, CHOLECALCIFEROL, PO Take 1,000 Units by mouth daily       Allergies   Allergen Reactions     Azithromycin Diarrhea     Ibuprofen Swelling     Around lips     Recent Labs  "  Lab Test  05/01/18   0922  02/09/18   1127  03/13/17   0917  09/07/16   0948   02/11/15   1406   A1C  5.5  5.5   --    --    --    --    LDL  185*  112*  108*   --    < >   --    HDL  50  55  49*   --    < >   --    TRIG  138  82  121   --    < >   --    ALT  48  33   --   49   < >  53*   CR  0.68  0.69   --   0.75   --   0.70   GFRESTIMATED  87  86   --   79   --   85   GFRESTBLACK  >90  >90   --   >90   GFR Calc     --   >90   GFR Calc     POTASSIUM  3.9  4.2   --    --    --   3.9   TSH   --    --    --    --    --   1.23    < > = values in this interval not displayed.      BP Readings from Last 3 Encounters:   05/01/18 120/80   04/03/18 (!) 141/94   02/09/18 139/81    Wt Readings from Last 3 Encounters:   05/01/18 126 lb (57.2 kg)   04/03/18 125 lb 8 oz (56.9 kg)   02/09/18 126 lb (57.2 kg)                  Labs reviewed in EPIC    Reviewed and updated as needed this visit by clinical staff  Tobacco  Allergies  Meds       Reviewed and updated as needed this visit by Provider         ROS:  Constitutional, HEENT, cardiovascular, pulmonary, GI, , musculoskeletal, neuro, skin, endocrine and psych systems are negative, except as otherwise noted.    OBJECTIVE:     /80  Pulse 99  Temp 97.8  F (36.6  C) (Oral)  Resp 16  Ht 5' 2\" (1.575 m)  Wt 126 lb (57.2 kg)  SpO2 98%  Breastfeeding? No  BMI 23.05 kg/m2  Body mass index is 23.05 kg/(m^2).  GENERAL: healthy, alert and no distress  EYES: Eyes grossly normal to inspection, PERRL and conjunctivae and sclerae normal  NECK: no adenopathy, no asymmetry, masses, or scars and thyroid normal to palpation  RESP: lungs clear to auscultation - no rales, rhonchi or wheezes  CV: regular rate and rhythm, normal S1 S2, no S3 or S4, no murmur, click or rub, no peripheral edema and peripheral pulses strong  ABDOMEN: soft, nontender, no hepatosplenomegaly, no masses and bowel sounds normal  MS: no gross musculoskeletal defects noted, no " edema  NEURO: Normal strength and tone, mentation intact and speech normal    Diagnostic Test Results:  Results for orders placed or performed in visit on 05/01/18   Lipid Profile (Chol, Trig, HDL, LDL calc)   Result Value Ref Range    Cholesterol 263 (H) <200 mg/dL    Triglycerides 138 <150 mg/dL    HDL Cholesterol 50 >49 mg/dL    LDL Cholesterol Calculated 185 (H) <100 mg/dL    Non HDL Cholesterol 213 (H) <130 mg/dL   Comprehensive metabolic panel (BMP + Alb, Alk Phos, ALT, AST, Total. Bili, TP)   Result Value Ref Range    Sodium 140 133 - 144 mmol/L    Potassium 3.9 3.4 - 5.3 mmol/L    Chloride 106 94 - 109 mmol/L    Carbon Dioxide 23 20 - 32 mmol/L    Anion Gap 11 3 - 14 mmol/L    Glucose 108 (H) 70 - 99 mg/dL    Urea Nitrogen 17 7 - 30 mg/dL    Creatinine 0.68 0.52 - 1.04 mg/dL    GFR Estimate 87 >60 mL/min/1.7m2    GFR Estimate If Black >90 >60 mL/min/1.7m2    Calcium 9.4 8.5 - 10.1 mg/dL    Bilirubin Total 0.4 0.2 - 1.3 mg/dL    Albumin 4.0 3.4 - 5.0 g/dL    Protein Total 7.7 6.8 - 8.8 g/dL    Alkaline Phosphatase 67 40 - 150 U/L    ALT 48 0 - 50 U/L    AST 21 0 - 45 U/L   Hemoglobin A1c   Result Value Ref Range    Hemoglobin A1C 5.5 0 - 5.6 %   ESR: Erythrocyte sedimentation rate   Result Value Ref Range    Sed Rate 13 0 - 30 mm/h   CRP, inflammation   Result Value Ref Range    CRP Inflammation <2.9 0.0 - 8.0 mg/L       ASSESSMENT/PLAN:       1. Hyperlipidemia LDL goal <130  She was on Lipitor and last time she saw me in February was taking it every other day and now in the past three months she has not taken it at all. She wanted to see what her lipids will be without the meds, and also she has had PMR in the past and now was having some vague muscle aches - not like before but wanted to see if getting off the Lipitor will help with those and it has not. She is fasting for lipids recheck today .  - Lipid Profile (Chol, Trig, HDL, LDL calc)  - Comprehensive metabolic panel (BMP + Alb, Alk Phos, ALT, AST,  Total. Bili, TP)  - Hemoglobin A1c    2. PMR (polymyalgia rheumatica) (H)  Has not been checked for a while and used to be high when the PMR was active , will recheck today .  - ESR: Erythrocyte sedimentation rate  - CRP, inflammation    3. Rash  Discussed trying 1% hydrocortisone and if no help will schedule an aapointment with derm, referral given.  - DERMATOLOGY REFERRAL    4. Elevated blood pressure reading without diagnosis of hypertension  She was rushed and a bit stressed out at the beginning of the encounter but recheck towards the end was fine, needs to f/u in 6 weeks for the lipids and will check the BP at that time .  - ESR: Erythrocyte sedimentation rate  - CRP, inflammation    RTC if no improving or worsening.  Pt is aware  and comfortable with the current plan.      Shona Jasso MD  [facility]

## 2018-05-01 NOTE — MR AVS SNAPSHOT
After Visit Summary   5/1/2018    Shanae Giang    MRN: [id number]           Patient Information     Date Of Birth          1955        Visit Information        Provider Department      5/1/2018 8:30 AM Shona Jasso MD [facility]        Today's Diagnoses     Hyperlipidemia LDL goal <130    -  1    PMR (polymyalgia rheumatica) (H)        Rash        Elevated blood pressure reading without diagnosis of hypertension           Follow-ups after your visit        Additional Services     DERMATOLOGY REFERRAL       Your provider has referred you to: [facility]: [facility] JEREL Gonzales [phone]   [email or web address]    Please be aware that coverage of these services is subject to the terms and limitations of your health insurance plan.  Call member services at your health plan with any benefit or coverage questions.      Please bring the following with you to your appointment:    (1) Any X-Rays, CTs or MRIs which have been performed.  Contact the facility where they were done to arrange for  prior to your scheduled appointment.    (2) List of current medications  (3) This referral request   (4) Any documents/labs given to you for this referral                  Who to contact     If you have questions or need follow up information about today's clinic visit or your schedule please contact [facility] directly at [phone].  Normal or non-critical lab and imaging results will be communicated to you by MyChart, letter or phone within 4 business days after the clinic has received the results. If you do not hear from us within 7 days, please contact the clinic through MyChart or phone. If you have a critical or abnormal lab result, we will notify you by phone as soon as possible.  Submit refill requests through [employer] or call your pharmacy and they will forward the refill request to us. Please allow 3 business days for your refill to be completed.        "   Additional Information About Your Visit        MyChart Information     [employer] gives you secure access to your electronic health record. If you see a primary care provider, you can also send messages to your care team and make appointments. If you have questions, please call your primary care clinic.  If you do not have a primary care provider, please call [phone] and they will assist you.        Care EveryWhere ID     This is your Care EveryWhere ID. This could be used by other organizations to access your [place] medical records  [id number]        Your Vitals Were     Pulse Temperature Respirations Height Pulse Oximetry Breastfeeding?    99 97.8  F (36.6  C) (Oral) 16 5' 2\" (1.575 m) 98% No    BMI (Body Mass Index)                   23.05 kg/m2            Blood Pressure from Last 3 Encounters:   05/01/18 120/80   04/03/18 (!) 141/94   02/09/18 139/81    Weight from Last 3 Encounters:   05/01/18 126 lb (57.2 kg)   04/03/18 125 lb 8 oz (56.9 kg)   02/09/18 126 lb (57.2 kg)              We Performed the Following     Comprehensive metabolic panel (BMP + Alb, Alk Phos, ALT, AST, Total. Bili, TP)     CRP, inflammation     DERMATOLOGY REFERRAL     ESR: Erythrocyte sedimentation rate     Hemoglobin A1c     Lipid Profile (Chol, Trig, HDL, LDL calc)        Primary Care Provider Office Phone # Fax #    Shona Jasso [phone] [address] [address]        Equal Access to Services     LISANDRO HAYES AH: Hadii aad ku hadasho Soomaali, waaxda luqadaha, qaybta kaalmada adeegyada, waxdanni idiin hayslim cano . So [facility] [phone].    ATENCIÓN: Si habla español, tiene a chatman disposición servicios gratuitos de asistencia lingüística. Llame al [phone].    We comply with applicable federal civil rights laws and [place] laws. We do not discriminate on the basis of race, color, national origin, age, disability, sex, sexual orientation, or gender " identity.            Thank you!     Thank you for choosing [facility]  for your care. Our goal is always to provide you with excellent care. Hearing back from our patients is one way we can continue to improve our services. Please take a few minutes to complete the written survey that you may receive in the mail after your visit with us. Thank you!             Your Updated Medication List - Protect others around you: Learn how to safely use, store and throw away your medicines at www.disposemymeds.org.          This list is accurate as of 5/1/18  9:11 AM.  Always use your most recent med list.                   Brand Name Dispense Instructions for use Diagnosis    atorvastatin 10 MG tablet    LIPITOR    90 tablet    Take 1 tablet (10 mg) by mouth daily    Hyperlipidemia LDL goal <130       ciclopirox 0.77 % cream    LOPROX    30 g    Apply topically 2 times daily    Tinea corporis       glucosamine-chondroitin 500-400 MG Caps per capsule      Take 1 capsule by mouth 2 times daily        MULTIVITAMIN TABS   OR      TAKE ONE TABLET BY MOUTH DAILY        TYLENOL PO      Take 500 mg by mouth every 6 hours as needed for mild pain or fever        UNABLE TO FIND      Take 1 tablet by mouth daily MEDICATION NAME: Tumeric        VITAMIN D (CHOLECALCIFEROL) PO      Take 1,000 Units by mouth daily

## 2018-05-03 ENCOUNTER — TELEPHONE (OUTPATIENT)
Dept: FAMILY MEDICINE | Facility: CLINIC | Age: 63
End: 2018-05-03

## 2018-05-03 DIAGNOSIS — E78.5 HYPERLIPIDEMIA LDL GOAL <130: Primary | ICD-10-CM

## 2018-05-03 RX ORDER — ATORVASTATIN CALCIUM 20 MG/1
20 TABLET, FILM COATED ORAL DAILY
Qty: 30 TABLET | Refills: 5 | Status: SHIPPED | OUTPATIENT
Start: 2018-05-03 | End: 2019-11-07 | Stop reason: ALTCHOICE

## 2018-05-03 NOTE — TELEPHONE ENCOUNTER
PT is calling about Lab results concerning her high lipid panel results.  She is inquiring if she needs to go back on medication similar to Lipitor, for instance Crestor ( which is what she prefers) .  Please follow up with a plan for pt to follow concerning her care.      She would like to go over other lab results also.      Please Call Shanae at [phone].

## 2018-05-04 RX ORDER — ROSUVASTATIN CALCIUM 5 MG/1
5 TABLET, COATED ORAL DAILY
Qty: 30 TABLET | Refills: 1 | Status: SHIPPED | OUTPATIENT
Start: 2018-05-04 | End: 2018-06-27

## 2018-05-05 NOTE — TELEPHONE ENCOUNTER
I have discussed the results with the pt and we agreed on her starting crestor at 5 mg daily dose and rechecking her lipids in 6 weeks , Pt is aware  and comfortable with the current plan.

## 2018-06-27 DIAGNOSIS — E78.5 HYPERLIPIDEMIA LDL GOAL <130: ICD-10-CM

## 2018-06-28 NOTE — TELEPHONE ENCOUNTER
"Requested Prescriptions   Pending Prescriptions Disp Refills     rosuvastatin (CRESTOR) 5 MG tablet [Pharmacy Med Name: ROSUVASTATIN 5MG TABLETS] 30 tablet 0     Sig: TAKE 1 TABLET(5 MG) BY MOUTH DAILY    Statins Protocol Passed    6/27/2018  5:14 PM       Passed - LDL on file in past 12 months    Recent Labs   Lab Test  05/01/18   0922   LDL  185*            Passed - No abnormal creatine kinase in past 12 months    Recent Labs   Lab Test  02/11/15   1406   CKT  39               Passed - Recent (12 mo) or future (30 days) visit within the authorizing provider's specialty    Patient had office visit in the last 12 months or has a visit in the next 30 days with authorizing provider or within the authorizing provider's specialty.  See \"Patient Info\" tab in inbasket, or \"Choose Columns\" in Meds & Orders section of the refill encounter.           Passed - Patient is age 18 or older       Passed - No active pregnancy on record       Passed - No positive pregnancy test in past 12 months        "

## 2018-06-29 RX ORDER — ROSUVASTATIN CALCIUM 5 MG/1
TABLET, COATED ORAL
Qty: 30 TABLET | Refills: 0 | Status: SHIPPED | OUTPATIENT
Start: 2018-06-29 | End: 2018-07-13

## 2018-06-29 NOTE — TELEPHONE ENCOUNTER
Medication is being filled for 1 time refill only due to:  Patient needs to be seen because due for BP f/u and fasting labs.   Karey Mead RN      Note from 5/1 OV:  1. Hyperlipidemia LDL goal <130  She was on Lipitor and last time she saw me in February was taking it every other day and now in the past three months she has not taken it at all. She wanted to see what her lipids will be without the meds, and also she has had PMR in the past and now was having some vague muscle aches - not like before but wanted to see if getting off the Lipitor will help with those and it has not. She is fasting for lipids recheck today .  - Lipid Profile (Chol, Trig, HDL, LDL calc)  - Comprehensive metabolic panel (BMP + Alb, Alk Phos, ALT, AST, Total. Bili, TP)  - Hemoglobin A1c    4. Elevated blood pressure reading without diagnosis of hypertension  She was rushed and a bit stressed out at the beginning of the encounter but recheck towards the end was fine, needs to f/u in 6 weeks for the lipids and will check the BP at that time .    Patient started on crestor.  [employer] message sent to patient asking her to schedule appt  Karey Mead RN

## 2018-07-13 ENCOUNTER — OFFICE VISIT (OUTPATIENT)
Dept: FAMILY MEDICINE | Facility: CLINIC | Age: 63
End: 2018-07-13
Payer: COMMERCIAL

## 2018-07-13 VITALS
OXYGEN SATURATION: 98 % | WEIGHT: 128.8 LBS | HEIGHT: 62 IN | BODY MASS INDEX: 23.7 KG/M2 | TEMPERATURE: 98 F | SYSTOLIC BLOOD PRESSURE: 130 MMHG | HEART RATE: 83 BPM | DIASTOLIC BLOOD PRESSURE: 75 MMHG

## 2018-07-13 DIAGNOSIS — L90.0 LICHEN SCLEROSUS: Primary | ICD-10-CM

## 2018-07-13 DIAGNOSIS — E78.5 HYPERLIPIDEMIA LDL GOAL <130: ICD-10-CM

## 2018-07-13 LAB
ALBUMIN SERPL-MCNC: 3.9 G/DL (ref 3.4–5)
ALP SERPL-CCNC: 73 U/L (ref 40–150)
ALT SERPL W P-5'-P-CCNC: 42 U/L (ref 0–50)
ANION GAP SERPL CALCULATED.3IONS-SCNC: 9 MMOL/L (ref 3–14)
AST SERPL W P-5'-P-CCNC: 16 U/L (ref 0–45)
BILIRUB SERPL-MCNC: 0.3 MG/DL (ref 0.2–1.3)
BUN SERPL-MCNC: 19 MG/DL (ref 7–30)
CALCIUM SERPL-MCNC: 9.2 MG/DL (ref 8.5–10.1)
CHLORIDE SERPL-SCNC: 108 MMOL/L (ref 94–109)
CHOLEST SERPL-MCNC: 185 MG/DL
CO2 SERPL-SCNC: 24 MMOL/L (ref 20–32)
CREAT SERPL-MCNC: 0.73 MG/DL (ref 0.52–1.04)
GFR SERPL CREATININE-BSD FRML MDRD: 80 ML/MIN/1.7M2
GLUCOSE SERPL-MCNC: 116 MG/DL (ref 70–99)
HDLC SERPL-MCNC: 58 MG/DL
LDLC SERPL CALC-MCNC: 111 MG/DL
NONHDLC SERPL-MCNC: 127 MG/DL
POTASSIUM SERPL-SCNC: 4.5 MMOL/L (ref 3.4–5.3)
PROT SERPL-MCNC: 7.7 G/DL (ref 6.8–8.8)
SODIUM SERPL-SCNC: 141 MMOL/L (ref 133–144)
TRIGL SERPL-MCNC: 78 MG/DL

## 2018-07-13 PROCEDURE — 80061 LIPID PANEL: CPT | Performed by: FAMILY MEDICINE

## 2018-07-13 PROCEDURE — 36415 COLL VENOUS BLD VENIPUNCTURE: CPT | Performed by: FAMILY MEDICINE

## 2018-07-13 PROCEDURE — 80053 COMPREHEN METABOLIC PANEL: CPT | Performed by: FAMILY MEDICINE

## 2018-07-13 PROCEDURE — 99214 OFFICE O/P EST MOD 30 MIN: CPT | Performed by: FAMILY MEDICINE

## 2018-07-13 RX ORDER — CLOBETASOL PROPIONATE 0.5 MG/G
OINTMENT TOPICAL
COMMUNITY
Start: 2018-06-30 | End: 2021-03-10

## 2018-07-13 RX ORDER — ROSUVASTATIN CALCIUM 5 MG/1
TABLET, COATED ORAL
Qty: 90 TABLET | Refills: 1 | Status: SHIPPED | OUTPATIENT
Start: 2018-07-13 | End: 2019-01-19

## 2018-07-13 NOTE — MR AVS SNAPSHOT
"              After Visit Summary   7/13/2018    Shanae Giang    MRN: [id number]           Patient Information     Date Of Birth          1955        Visit Information        Provider Department      7/13/2018 9:00 AM Shona Jasso MD [facility]        Today's Diagnoses     Lichen sclerosus    -  1    Hyperlipidemia LDL goal <130           Follow-ups after your visit        Who to contact     If you have questions or need follow up information about today's clinic visit or your schedule please contact [facility] directly at [phone].  Normal or non-critical lab and imaging results will be communicated to you by Korbitechart, letter or phone within 4 business days after the clinic has received the results. If you do not hear from us within 7 days, please contact the clinic through Korbitechart or phone. If you have a critical or abnormal lab result, we will notify you by phone as soon as possible.  Submit refill requests through NoiseFree or call your pharmacy and they will forward the refill request to us. Please allow 3 business days for your refill to be completed.          Additional Information About Your Visit        MyChart Information     NoiseFree gives you secure access to your electronic health record. If you see a primary care provider, you can also send messages to your care team and make appointments. If you have questions, please call your primary care clinic.  If you do not have a primary care provider, please call [phone] and they will assist you.        Care EveryWhere ID     This is your Care EveryWhere ID. This could be used by other organizations to access your [facility] medical records  [id number]        Your Vitals Were     Pulse Temperature Height Pulse Oximetry BMI (Body Mass Index)       83 98  F (36.7  C) (Oral) 5' 2\" (1.575 m) 98% 23.56 kg/m2        Blood Pressure from Last 3 Encounters:   07/13/18 130/75   05/01/18 120/80   04/03/18 (!) 141/94    Weight from " Last 3 Encounters:   07/13/18 128 lb 12.8 oz (58.4 kg)   05/01/18 126 lb (57.2 kg)   04/03/18 125 lb 8 oz (56.9 kg)              We Performed the Following     Comprehensive metabolic panel (BMP + Alb, Alk Phos, ALT, AST, Total. Bili, TP)     Lipid Profile (Chol, Trig, HDL, LDL calc)          Where to get your medicines      These medications were sent to [id number] - [place], [place] - [address] [facility] [address]  [address], [place] [address]     Phone:  [phone]     rosuvastatin 5 MG tablet          Primary Care Provider Office Phone # Fax #    Shona Jasso [phone] [address] [facility] [address] [place] [id number]        Equal Access to Services     : Hadii aad ku hadasho Soomaali, waaxda luqadaha, qaybta kaalmada adeegyada, waxay radhain hayaan adecalderon cano . So [facility] [phone].    ATENCIÓN: Si habla español, tiene a chatman disposición servicios gratuitos de asistencia lingüística. Gracia al [phone].    We comply with applicable federal civil rights laws and [place] laws. We do not discriminate on the basis of race, color, national origin, age, disability, sex, sexual orientation, or gender identity.            Thank you!     Thank you for choosing [facility]  for your care. Our goal is always to provide you with excellent care. Hearing back from our patients is one way we can continue to improve our services. Please take a few minutes to complete the written survey that you may receive in the mail after your visit with us. Thank you!             Your Updated Medication List - Protect others around you: Learn how to safely use, store and throw away your medicines at www.disposemymeds.org.          This list is accurate as of 7/13/18 10:47 AM.  Always use your most recent med list.                   Brand Name Dispense Instructions for use Diagnosis    * atorvastatin 10 MG tablet    LIPITOR    90 tablet    Take 1  tablet (10 mg) by mouth daily    Hyperlipidemia LDL goal <130       * atorvastatin 20 MG tablet    LIPITOR    30 tablet    Take 1 tablet (20 mg) by mouth daily    Pure hypercholesterolemia       ciclopirox 0.77 % cream    LOPROX    30 g    Apply topically 2 times daily    Tinea corporis       clobetasol 0.05 % ointment    TEMOVATE          glucosamine-chondroitin 500-400 MG Caps per capsule      Take 1 capsule by mouth 2 times daily        MULTIVITAMIN TABS   OR      TAKE ONE TABLET BY MOUTH DAILY        rosuvastatin 5 MG tablet    CRESTOR    90 tablet    TAKE 1 TABLET(5 MG) BY MOUTH DAILY    Hyperlipidemia LDL goal <130       TYLENOL PO      Take 500 mg by mouth every 6 hours as needed for mild pain or fever        UNABLE TO FIND      Take 1 tablet by mouth daily MEDICATION NAME: Tumeric        VITAMIN D (CHOLECALCIFEROL) PO      Take 1,000 Units by mouth daily        * Notice:  This list has 2 medication(s) that are the same as other medications prescribed for you. Read the directions carefully, and ask your doctor or other care provider to review them with you.

## 2018-07-13 NOTE — NURSING NOTE
"Chief Complaint   Patient presents with     Hypertension     /75  Pulse 83  Temp 98  F (36.7  C) (Oral)  Ht 5' 2\" (1.575 m)  Wt 128 lb 12.8 oz (58.4 kg)  SpO2 98%  BMI 23.56 kg/m2 Estimated body mass index is 23.56 kg/(m^2) as calculated from the following:    Height as of this encounter: 5' 2\" (1.575 m).    Weight as of this encounter: 128 lb 12.8 oz (58.4 kg).  Medication Reconciliation: complete      Health Maintenance that is potentially due pending provider review:  NONE    n/a    JESS Willard  "

## 2018-07-13 NOTE — PROGRESS NOTES
SUBJECTIVE:   Shanae Giang is a 62 year old female who presents to clinic today for the following health issues:      Hypertension Follow-up- seems that BP are normal at home , here first was elevated but then rechecked twice was normal BP , she denies any symtpoms       Outpatient blood pressures are not being checked.    Low Salt Diet: no added salt      Amount of exercise or physical activity: 2-3 days/week for an average of 45-60 minutes    Problems taking medications regularly: No    Medication side effects: none    Diet: regular (no restrictions)  2) saw a dermatologist and had bx on the skin rash/dry patch and was diagnosed with lichen sclerosis -clobetasole cream 0.05 % is working well for her now , although she noticed more patches now on the shoulder and arms.uses the cream topically   3) restarted Crestor at 5 mg and no muscle aches or pains , doing well on it , needs refills and she is fasting today to recheck her lipids         Problem list and histories reviewed & adjusted, as indicated.  Additional history: as documented    Patient Active Problem List   Diagnosis     Osteoarthrosis, unspecified whether generalized or localized, pelvic region and thigh     Disorder of bone and cartilage     HYPERLIPIDEMIA LDL GOAL <130     Elevated C-reactive protein (CRP)     PMR (polymyalgia rheumatica) (H)     Osteopenia     Tinea corporis     Past Surgical History:   Procedure Laterality Date     C NONSPECIFIC PROCEDURE  09-09-98    s/p L hip replacement     C NONSPECIFIC PROCEDURE  Age 6/7    Rotational osteotomy of Left hip       Social History   Substance Use Topics     Smoking status: Never Smoker     Smokeless tobacco: Never Used     Alcohol use 0.0 - 0.6 oz/week     0 - 1 Standard drinks or equivalent per week      Comment: Very Rarely-3 a year     Family History   Problem Relation Age of Onset     JESSICAAJESSY. Maternal Grandmother      CHF     C.A.GIL. Maternal Grandfather      Had heart surgery     HEART  DISEASE Mother      CHF     HEART DISEASE Maternal Grandfather      MI- first at age 30,  of one in late 50's     HEART DISEASE Paternal Grandmother      ?     Hypertension Mother      Hypertension Maternal Grandmother      Hypertension Brother      Alcohol/Drug Father      Alcohol/Drug Paternal Grandmother      Alcohol/Drug Sister      Alcohol/Drug Brother      Depression Father      Depression Sister      GASTROINTESTINAL DISEASE Father      Had to have part of stomach removed-many ulcers     GASTROINTESTINAL DISEASE Sister      Esophageal Reflux, ulcers     Psychotic Disorder Sister      Psychotic Disorder Sister      Depression Brother          Current Outpatient Prescriptions   Medication Sig Dispense Refill     Acetaminophen (TYLENOL PO) Take 500 mg by mouth every 6 hours as needed for mild pain or fever       clobetasol (TEMOVATE) 0.05 % ointment        glucosamine-chondroitin 500-400 MG CAPS Take 1 capsule by mouth 2 times daily        MULTIVITAMIN TABS   OR TAKE ONE TABLET BY MOUTH DAILY       rosuvastatin (CRESTOR) 5 MG tablet TAKE 1 TABLET(5 MG) BY MOUTH DAILY 90 tablet 1     UNABLE TO FIND Take 1 tablet by mouth daily MEDICATION NAME: Tumeric       atorvastatin (LIPITOR) 10 MG tablet Take 1 tablet (10 mg) by mouth daily (Patient not taking: Reported on 2018) 90 tablet 3     atorvastatin (LIPITOR) 20 MG tablet Take 1 tablet (20 mg) by mouth daily 30 tablet 5     ciclopirox (LOPROX) 0.77 % cream Apply topically 2 times daily 30 g 1     VITAMIN D, CHOLECALCIFEROL, PO Take 1,000 Units by mouth daily       [DISCONTINUED] rosuvastatin (CRESTOR) 5 MG tablet TAKE 1 TABLET(5 MG) BY MOUTH DAILY 30 tablet 0     Allergies   Allergen Reactions     Azithromycin Diarrhea     Ibuprofen Swelling     Around lips     Recent Labs   Lab Test  18   0922  18   1127  17   0917  16   0948   02/11/15   1406   A1C  5.5  5.5   --    --    --    --    LDL  185*  112*  108*   --    < >   --    HDL  50   "55  49*   --    < >   --    TRIG  138  82  121   --    < >   --    ALT  48  33   --   49   < >  53*   CR  0.68  0.69   --   0.75   --   0.70   GFRESTIMATED  87  86   --   79   --   85   GFRESTBLACK  >90  >90   --   >90   GFR Calc     --   >90   GFR Calc     POTASSIUM  3.9  4.2   --    --    --   3.9   TSH   --    --    --    --    --   1.23    < > = values in this interval not displayed.      BP Readings from Last 3 Encounters:   07/13/18 130/75   05/01/18 120/80   04/03/18 (!) 141/94    Wt Readings from Last 3 Encounters:   07/13/18 128 lb 12.8 oz (58.4 kg)   05/01/18 126 lb (57.2 kg)   04/03/18 125 lb 8 oz (56.9 kg)                  Labs reviewed in EPIC    Reviewed and updated as needed this visit by clinical staff  Allergies  Meds       Reviewed and updated as needed this visit by Provider         ROS:  Constitutional, HEENT, cardiovascular, pulmonary, GI, , musculoskeletal, neuro, skin, endocrine and psych systems are negative, except as otherwise noted.    OBJECTIVE:     /75  Pulse 83  Temp 98  F (36.7  C) (Oral)  Ht 5' 2\" (1.575 m)  Wt 128 lb 12.8 oz (58.4 kg)  SpO2 98%  BMI 23.56 kg/m2  Body mass index is 23.56 kg/(m^2).  GENERAL: healthy, alert and no distress  EYES: Eyes grossly normal to inspection, PERRL and conjunctivae and sclerae normal  NECK: no adenopathy, no asymmetry, masses, or scars and thyroid normal to palpation  RESP: lungs clear to auscultation - no rales, rhonchi or wheezes  CV: regular rate and rhythm, normal S1 S2, no S3 or S4, no murmur, click or rub, no peripheral edema and peripheral pulses strong  ABDOMEN: soft, nontender, no hepatosplenomegaly, no masses and bowel sounds normal  MS: no gross musculoskeletal defects noted, no edema  NEURO: Normal strength and tone, mentation intact and speech normal  PSYCH: mentation appears normal, affect normal/bright    Diagnostic Test Results:  No results found for this or any previous visit (from " the past 24 hour(s)).    ASSESSMENT/PLAN:         1. Hyperlipidemia LDL goal <130  Seems that she is doing fine , no side effects on the 5mg of crestor and will recheck her lipids today ,   - Lipid Profile (Chol, Trig, HDL, LDL calc)  - Comprehensive metabolic panel (BMP + Alb, Alk Phos, ALT, AST, Total. Bili, TP)  - rosuvastatin (CRESTOR) 5 MG tablet; TAKE 1 TABLET(5 MG) BY MOUTH DAILY  Dispense: 90 tablet; Refill: 1  2. Lichen sclerosis , seen derm and uses the clobetasol cream which helps with the dry skin patches - continue same   3. Elevated BP , seems to be well under control at home and here rechecked manually it was under 140 over 90   F/u OV in two months , sooner if any concerns , Pt is aware  and comfortable with the current plan.      Shona Jasso MD  [facility]

## 2018-07-29 DIAGNOSIS — E78.5 HYPERLIPIDEMIA LDL GOAL <130: ICD-10-CM

## 2018-07-31 RX ORDER — ROSUVASTATIN CALCIUM 5 MG/1
TABLET, COATED ORAL
Start: 2018-07-31

## 2018-07-31 NOTE — TELEPHONE ENCOUNTER
"Denied  Too early; Rx sent 7/13/2018 for 6 months  Tonja MARMOLEJO RN    Requested Prescriptions   Pending Prescriptions Disp Refills     rosuvastatin (CRESTOR) 5 MG tablet [Pharmacy Med Name: ROSUVASTATIN 5MG TABLETS] 30 tablet 0     Sig: TAKE 1 TABLET BY MOUTH EVERY DAY    Statins Protocol Passed    7/29/2018  1:09 PM       Passed - LDL on file in past 12 months    Recent Labs   Lab Test  07/13/18   0931   LDL  111*            Passed - No abnormal creatine kinase in past 12 months    Recent Labs   Lab Test  02/11/15   1406   CKT  39               Passed - Recent (12 mo) or future (30 days) visit within the authorizing provider's specialty    Patient had office visit in the last 12 months or has a visit in the next 30 days with authorizing provider or within the authorizing provider's specialty.  See \"Patient Info\" tab in inbasket, or \"Choose Columns\" in Meds & Orders section of the refill encounter.           Passed - Patient is age 18 or older       Passed - No active pregnancy on record       Passed - No positive pregnancy test in past 12 months              "

## 2018-12-21 ENCOUNTER — ALLIED HEALTH/NURSE VISIT (OUTPATIENT)
Dept: NURSING | Facility: CLINIC | Age: 63
End: 2018-12-21

## 2018-12-21 DIAGNOSIS — Z23 NEED FOR PROPHYLACTIC VACCINATION AND INOCULATION AGAINST INFLUENZA: Primary | ICD-10-CM

## 2018-12-21 PROCEDURE — 99207 ZZC NO CHARGE NURSE ONLY: CPT

## 2018-12-21 PROCEDURE — 90471 IMMUNIZATION ADMIN: CPT

## 2018-12-21 PROCEDURE — 90662 IIV NO PRSV INCREASED AG IM: CPT

## 2019-01-19 DIAGNOSIS — E78.5 HYPERLIPIDEMIA LDL GOAL <130: ICD-10-CM

## 2019-01-21 RX ORDER — ROSUVASTATIN CALCIUM 5 MG/1
TABLET, COATED ORAL
Qty: 90 TABLET | Refills: 1 | Status: SHIPPED | OUTPATIENT
Start: 2019-01-21 | End: 2019-11-05

## 2019-01-21 NOTE — TELEPHONE ENCOUNTER
"Prescription approved per [facility] Refill Protocol.  Tonja MARMOLEJO RN    Requested Prescriptions   Pending Prescriptions Disp Refills     rosuvastatin (CRESTOR) 5 MG tablet [Pharmacy Med Name: ROSUVASTATIN 5MG TABLETS] 90 tablet 0     Sig: TAKE 1 TABLET(5 MG) BY MOUTH DAILY    Statins Protocol Passed - 1/19/2019  3:09 PM       Passed - LDL on file in past 12 months    Recent Labs   Lab Test 07/13/18  0931   *            Passed - No abnormal creatine kinase in past 12 months    Recent Labs   Lab Test 02/11/15  1406   CKT 39               Passed - Recent (12 mo) or future (30 days) visit within the authorizing provider's specialty    Patient had office visit in the last 12 months or has a visit in the next 30 days with authorizing provider or within the authorizing provider's specialty.  See \"Patient Info\" tab in inbasket, or \"Choose Columns\" in Meds & Orders section of the refill encounter.             Passed - Medication is active on med list       Passed - Patient is age 18 or older       Passed - No active pregnancy on record       Passed - No positive pregnancy test in past 12 months            "

## 2019-09-28 ENCOUNTER — HEALTH MAINTENANCE LETTER (OUTPATIENT)
Age: 64
End: 2019-09-28

## 2019-10-25 ENCOUNTER — ALLIED HEALTH/NURSE VISIT (OUTPATIENT)
Dept: NURSING | Facility: CLINIC | Age: 64
End: 2019-10-25
Payer: MEDICARE

## 2019-10-25 DIAGNOSIS — Z23 NEED FOR PROPHYLACTIC VACCINATION AND INOCULATION AGAINST INFLUENZA: Primary | ICD-10-CM

## 2019-10-25 PROCEDURE — G0008 ADMIN INFLUENZA VIRUS VAC: HCPCS

## 2019-10-25 PROCEDURE — 90662 IIV NO PRSV INCREASED AG IM: CPT

## 2019-10-25 PROCEDURE — 99207 ZZC NO CHARGE NURSE ONLY: CPT

## 2019-11-03 ASSESSMENT — ACTIVITIES OF DAILY LIVING (ADL): CURRENT_FUNCTION: NO ASSISTANCE NEEDED

## 2019-11-05 ENCOUNTER — OFFICE VISIT (OUTPATIENT)
Dept: FAMILY MEDICINE | Facility: CLINIC | Age: 64
End: 2019-11-05
Payer: MEDICARE

## 2019-11-05 VITALS
TEMPERATURE: 97.5 F | DIASTOLIC BLOOD PRESSURE: 68 MMHG | SYSTOLIC BLOOD PRESSURE: 138 MMHG | HEIGHT: 62 IN | RESPIRATION RATE: 12 BRPM | HEART RATE: 86 BPM | OXYGEN SATURATION: 100 % | WEIGHT: 123 LBS | BODY MASS INDEX: 22.63 KG/M2

## 2019-11-05 DIAGNOSIS — Z23 ENCOUNTER FOR IMMUNIZATION: ICD-10-CM

## 2019-11-05 DIAGNOSIS — E78.5 HYPERLIPIDEMIA LDL GOAL <130: ICD-10-CM

## 2019-11-05 DIAGNOSIS — M89.9 DISORDER OF BONE, UNSPECIFIED: ICD-10-CM

## 2019-11-05 DIAGNOSIS — Z00.00 ENCOUNTER FOR ROUTINE ADULT HEALTH EXAMINATION WITHOUT ABNORMAL FINDINGS: Primary | ICD-10-CM

## 2019-11-05 DIAGNOSIS — L90.0 LICHEN SCLEROSUS: ICD-10-CM

## 2019-11-05 DIAGNOSIS — Z12.11 ENCOUNTER FOR SCREENING FOR MALIGNANT NEOPLASM OF COLON: ICD-10-CM

## 2019-11-05 DIAGNOSIS — M35.3 PMR (POLYMYALGIA RHEUMATICA) (H): ICD-10-CM

## 2019-11-05 LAB
ALBUMIN SERPL-MCNC: 4 G/DL (ref 3.4–5)
ALP SERPL-CCNC: 63 U/L (ref 40–150)
ALT SERPL W P-5'-P-CCNC: 44 U/L (ref 0–50)
ANION GAP SERPL CALCULATED.3IONS-SCNC: 9 MMOL/L (ref 3–14)
AST SERPL W P-5'-P-CCNC: 21 U/L (ref 0–45)
BILIRUB SERPL-MCNC: 0.6 MG/DL (ref 0.2–1.3)
BUN SERPL-MCNC: 17 MG/DL (ref 7–30)
CALCIUM SERPL-MCNC: 8.7 MG/DL (ref 8.5–10.1)
CHLORIDE SERPL-SCNC: 109 MMOL/L (ref 94–109)
CO2 SERPL-SCNC: 23 MMOL/L (ref 20–32)
CREAT SERPL-MCNC: 0.66 MG/DL (ref 0.52–1.04)
CRP SERPL-MCNC: <2.9 MG/L (ref 0–8)
ERYTHROCYTE [SEDIMENTATION RATE] IN BLOOD BY WESTERGREN METHOD: 8 MM/H (ref 0–30)
GFR SERPL CREATININE-BSD FRML MDRD: >90 ML/MIN/{1.73_M2}
GLUCOSE SERPL-MCNC: 109 MG/DL (ref 70–99)
POTASSIUM SERPL-SCNC: 4 MMOL/L (ref 3.4–5.3)
PROT SERPL-MCNC: 7.7 G/DL (ref 6.8–8.8)
SODIUM SERPL-SCNC: 141 MMOL/L (ref 133–144)
TSH SERPL DL<=0.005 MIU/L-ACNC: 3.16 MU/L (ref 0.4–4)

## 2019-11-05 PROCEDURE — 85652 RBC SED RATE AUTOMATED: CPT | Performed by: FAMILY MEDICINE

## 2019-11-05 PROCEDURE — 80053 COMPREHEN METABOLIC PANEL: CPT | Performed by: FAMILY MEDICINE

## 2019-11-05 PROCEDURE — 99213 OFFICE O/P EST LOW 20 MIN: CPT | Mod: 25 | Performed by: FAMILY MEDICINE

## 2019-11-05 PROCEDURE — G0009 ADMIN PNEUMOCOCCAL VACCINE: HCPCS | Performed by: FAMILY MEDICINE

## 2019-11-05 PROCEDURE — 90670 PCV13 VACCINE IM: CPT | Performed by: FAMILY MEDICINE

## 2019-11-05 PROCEDURE — G0438 PPPS, INITIAL VISIT: HCPCS | Performed by: FAMILY MEDICINE

## 2019-11-05 PROCEDURE — 84443 ASSAY THYROID STIM HORMONE: CPT | Performed by: FAMILY MEDICINE

## 2019-11-05 PROCEDURE — 86140 C-REACTIVE PROTEIN: CPT | Performed by: FAMILY MEDICINE

## 2019-11-05 PROCEDURE — 82306 VITAMIN D 25 HYDROXY: CPT | Performed by: FAMILY MEDICINE

## 2019-11-05 PROCEDURE — 36415 COLL VENOUS BLD VENIPUNCTURE: CPT | Performed by: FAMILY MEDICINE

## 2019-11-05 RX ORDER — ROSUVASTATIN CALCIUM 5 MG/1
TABLET, COATED ORAL
Qty: 90 TABLET | Refills: 1 | Status: SHIPPED | OUTPATIENT
Start: 2019-11-05 | End: 2020-05-04

## 2019-11-05 ASSESSMENT — ACTIVITIES OF DAILY LIVING (ADL): CURRENT_FUNCTION: NO ASSISTANCE NEEDED

## 2019-11-05 ASSESSMENT — MIFFLIN-ST. JEOR: SCORE: 1052.2

## 2019-11-05 NOTE — PATIENT INSTRUCTIONS
PLEASE CALL TO SCHEDULE YOUR MAMMOGRAM  [facility] [phone]  [facility] [place] [phone]  [facility]   [phone]  Central Scheduling (all locations) [phone]    If you are under/uninsured, we recommend you contact the [facility]. They offer mammograms on a sliding fee charge. You can schedule with them at [phone].

## 2019-11-05 NOTE — PROGRESS NOTES
"SUBJECTIVE:   Shanae Giang is a 63 year old female who presents for Preventive Visit.      Are you in the first 12 months of your Medicare coverage?  Yes,  Visual Acuity:  Right Eye: 20/32   Left Eye: 20/20  Both Eyes: 20/20 with glasses     Healthy Habits:     In general, how would you rate your overall health?  Good    Frequency of exercise:  2-3 days/week    Duration of exercise:  Less than 15 minutes    Do you usually eat at least 4 servings of fruit and vegetables a day, include whole grains    & fiber and avoid regularly eating high fat or \"junk\" foods?  No    Taking medications regularly:  Yes    Medication side effects:  None    Ability to successfully perform activities of daily living:  No assistance needed    Home Safety:  No safety concerns identified    Hearing Impairment:  No hearing concerns    In the past 6 months, have you been bothered by leaking of urine?  No    In general, how would you rate your overall mental or emotional health?  Excellent      PHQ-2 Total Score: 0    Additional concerns today:  Yes  would like lab work.    1) she has had PMR in the past no recent symptoms in last two or three year but would liek to stay on top and wants me to check her ESR and CRP just in case as she has had some arm and thigh pain with exercise more   2) hypercholesterolemia , she is on Crestor 5 mg and will check labs , she denies any side effects with this , is fasting   3) lichen sclerosis- is under control with occasional clobetazol cream use as needed   Needs to do her Pneumonia shot today , she has gotten her flu shot already   Will need to check thyroid and Vit D levels as those have been low in the past   Do you feel safe in your environment? Yes    Have you ever done Advance Care Planning? (For example, a Health Directive, POLST, or a discussion with a medical provider or your loved ones about your wishes): Yes, patient states has an Advance Care Planning document and will bring a copy to the " clinic.      Fall risk  Fallen 2 or more times in the past year?: No  Any fall with injury in the past year?: No    Cognitive Screening   1) Repeat 3 items (Leader, Season, Table)    2) Clock draw: NORMAL  3) 3 item recall: Recalls 2 objects   Results: NORMAL clock, 1-2 items recalled: COGNITIVE IMPAIRMENT LESS LIKELY    Mini-CogTM Copyright DEONTE Vaca. Licensed by the author for use in [facility]; reprinted with permission (soob@[facility]). All rights reserved.      Do you have sleep apnea, excessive snoring or daytime drowsiness?: no    Reviewed and updated as needed this visit by clinical staff  Tobacco  Allergies  Meds         Reviewed and updated as needed this visit by Provider        Social History     Tobacco Use     Smoking status: Never Smoker     Smokeless tobacco: Never Used   Substance Use Topics     Alcohol use: Yes     Alcohol/week: 0.0 - 1.0 standard drinks     Comment: Very Rarely-3 a year     If you drink alcohol do you typically have >3 drinks per day or >7 drinks per week? No    Alcohol Use 11/3/2019   Prescreen: >3 drinks/day or >7 drinks/week? Not Applicable   Prescreen: >3 drinks/day or >7 drinks/week? -   No flowsheet data found.            Current providers sharing in care for this patient include:   Patient Care Team:  Shona Jasso MD as PCP - General (Family Practice)  Shona Jasso MD as Assigned PCP    The following health maintenance items are reviewed in Epic and correct as of today:  Health Maintenance   Topic Date Due     HIV SCREENING  11/22/1970     ZOSTER IMMUNIZATION (2 of 3) 07/03/2014     ADVANCE CARE PLANNING  12/10/2017     FALL RISK ASSESSMENT  11/22/2018     PNEUMOCOCCAL IMMUNIZATION 65+ LOW/MEDIUM RISK (1 of 2 - PCV13) 11/22/2018     MEDICARE ANNUAL WELLNESS VISIT  02/09/2019     FIT  02/23/2019     MAMMO SCREENING  12/21/2019     DTAP/TDAP/TD IMMUNIZATION (3 - Td) 02/16/2020     LIPID  07/13/2023     DEXA  Completed     HEPATITIS C SCREENING  Completed      PHQ-2  Completed     INFLUENZA VACCINE  Completed     IPV IMMUNIZATION  Aged Out     MENINGITIS IMMUNIZATION  Aged Out     Lab work is in process  Labs reviewed in EPIC  BP Readings from Last 3 Encounters:   19 138/68   18 130/75   18 120/80    Wt Readings from Last 3 Encounters:   19 55.8 kg (123 lb)   18 58.4 kg (128 lb 12.8 oz)   18 57.2 kg (126 lb)                  Patient Active Problem List   Diagnosis     Osteoarthrosis, unspecified whether generalized or localized, pelvic region and thigh     Disorder of bone and cartilage     HYPERLIPIDEMIA LDL GOAL <130     Elevated C-reactive protein (CRP)     PMR (polymyalgia rheumatica) (H)     Osteopenia     Tinea corporis     Lichen sclerosus     Past Surgical History:   Procedure Laterality Date     C NONSPECIFIC PROCEDURE  89    s/p L hip replacement     C NONSPECIFIC PROCEDURE  Age 6/7    Rotational osteotomy of Left hip       Social History     Tobacco Use     Smoking status: Never Smoker     Smokeless tobacco: Never Used   Substance Use Topics     Alcohol use: Yes     Alcohol/week: 0.0 - 1.0 standard drinks     Comment: Very Rarely-3 a year     Family History   Problem Relation Age of Onset     Heart Disease Mother         CHF     Hypertension Mother      Alcohol/Drug Father      Depression Father      Gastrointestinal Disease Father         Had to have part of stomach removed-many ulcers     C.A.D. Maternal Grandmother         CHF     Hypertension Maternal Grandmother      C.A.D. Maternal Grandfather         Had heart surgery     Heart Disease Maternal Grandfather         MI- first at age 30,  of one in late 50's     Heart Disease Paternal Grandmother         ?     Alcohol/Drug Paternal Grandmother      Hypertension Brother      Alcohol/Drug Sister      Alcohol/Drug Brother      Depression Sister      Gastrointestinal Disease Sister         Esophageal Reflux, ulcers     Psychotic Disorder Sister      Psychotic  Disorder Sister      Depression Brother          Current Outpatient Medications   Medication Sig Dispense Refill     Acetaminophen (TYLENOL PO) Take 500 mg by mouth every 6 hours as needed for mild pain or fever       rosuvastatin (CRESTOR) 5 MG tablet One tablet by mouth daily 90 tablet 1     atorvastatin (LIPITOR) 10 MG tablet Take 1 tablet (10 mg) by mouth daily (Patient not taking: Reported on 5/1/2018) 90 tablet 3     atorvastatin (LIPITOR) 20 MG tablet Take 1 tablet (20 mg) by mouth daily 30 tablet 5     ciclopirox (LOPROX) 0.77 % cream Apply topically 2 times daily 30 g 1     clobetasol (TEMOVATE) 0.05 % ointment        glucosamine-chondroitin 500-400 MG CAPS Take 1 capsule by mouth 2 times daily        MULTIVITAMIN TABS   OR TAKE ONE TABLET BY MOUTH DAILY       UNABLE TO FIND Take 1 tablet by mouth daily MEDICATION NAME: Tumeric       VITAMIN D, CHOLECALCIFEROL, PO Take 1,000 Units by mouth daily       Allergies   Allergen Reactions     Azithromycin Diarrhea     Recent Labs   Lab Test 07/13/18  0931 05/01/18  0922 02/09/18  1127  02/11/15  1406   A1C  --  5.5 5.5  --   --    * 185* 112*   < >  --    HDL 58 50 55   < >  --    TRIG 78 138 82   < >  --    ALT 42 48 33   < > 53*   CR 0.73 0.68 0.69   < > 0.70   GFRESTIMATED 80 87 86   < > 85   GFRESTBLACK >90 >90 >90   < > >90  African American GFR Calc     POTASSIUM 4.5 3.9 4.2  --  3.9   TSH  --   --   --   --  1.23    < > = values in this interval not displayed.      Pneumonia Vaccine:Adults age 65+ who have not received previous Pneumovax (PPSV23) or PCV13 as an adult: Should first be given PCV13 AND then should be given PPSV23 6-12 months after PCV13    Review of Systems  Constitutional, HEENT, cardiovascular, pulmonary, GI, , musculoskeletal, neuro, skin, endocrine and psych systems are negative, except as otherwise noted.    OBJECTIVE:   /68 (BP Location: Right arm, Patient Position: Sitting, Cuff Size: Adult Regular)   Pulse 86   Temp  "97.5  F (36.4  C) (Oral)   Resp 12   Ht 1.568 m (5' 1.75\")   Wt 55.8 kg (123 lb)   SpO2 100%   Breastfeeding? No   BMI 22.68 kg/m   Estimated body mass index is 22.68 kg/m  as calculated from the following:    Height as of this encounter: 1.568 m (5' 1.75\").    Weight as of this encounter: 55.8 kg (123 lb).  Physical Exam  GENERAL: healthy, alert and no distress  EYES: Eyes grossly normal to inspection, PERRL and conjunctivae and sclerae normal  NECK: no adenopathy, no asymmetry, masses, or scars and thyroid normal to palpation  RESP: lungs clear to auscultation - no rales, rhonchi or wheezes  BREAST: normal without masses, tenderness or nipple discharge and no palpable axillary masses or adenopathy  CV: regular rate and rhythm, normal S1 S2, no S3 or S4, no murmur, click or rub, no peripheral edema and peripheral pulses strong  ABDOMEN: soft, nontender, no hepatosplenomegaly, no masses and bowel sounds normal  MS: no gross musculoskeletal defects noted, no edema    Diagnostic Test Results:  Labs reviewed in Epic  Results for orders placed or performed in visit on 11/05/19 (from the past 24 hour(s))   ESR: Erythrocyte sedimentation rate   Result Value Ref Range    Sed Rate 8 0 - 30 mm/h       ASSESSMENT / PLAN:   1. Encounter for routine adult health examination without abnormal findings  Discussed diet,calcium,exercise.Went over self breast exam.Thin prep was NOT done.Eyes and teeth UTD.No immunizations needed today.See orders below for tests ordered and screening needed.    - TSH with free T4 reflex    2. Hyperlipidemia LDL goal <130  Is fasting for labs and will check , denies side effects and also needs refills on her medication   - Comprehensive metabolic panel (BMP + Alb, Alk Phos, ALT, AST, Total. Bili, TP)  - Lipid panel reflex to direct LDL Fasting; Future  - rosuvastatin (CRESTOR) 5 MG tablet; One tablet by mouth daily  Dispense: 90 tablet; Refill: 1    3. PMR (polymyalgia rheumatica) (H)  She has a " "rheumatologist but has not seen them in a few yrs and symptoms under control but will check labs today .  - ESR: Erythrocyte sedimentation rate  - CRP, inflammation    4. Lichen sclerosus  Is well controlled , uses clobetasol as needed with exacerbations     5. Encounter for screening for malignant neoplasm of colon   Will do the FIT and is due for a mammogram as well .  - Fecal colorectal cancer screen (FIT); Future    6. Disorder of bone, unspecified   Will check levels   - Vitamin D Deficiency    7. Encounter for immunization   Got her pneumonia shot today first one   - Pneumococcal vaccine 13 valent PCV13 IM (Prevnar) [[id number]]    COUNSELING:  Reviewed preventive health counseling, as reflected in patient instructions       Regular exercise       Healthy diet/nutrition       Vision screening       Hearing screening       Dental care       Osteoporosis Prevention/Bone Health    Estimated body mass index is 22.68 kg/m  as calculated from the following:    Height as of this encounter: 1.568 m (5' 1.75\").    Weight as of this encounter: 55.8 kg (123 lb).         reports that she has never smoked. She has never used smokeless tobacco.      Appropriate preventive services were discussed with this patient, including applicable screening as appropriate for cardiovascular disease, diabetes, osteopenia/osteoporosis, and glaucoma.  As appropriate for age/gender, discussed screening for colorectal cancer, prostate cancer, breast cancer, and cervical cancer. Checklist reviewing preventive services available has been given to the patient.    Reviewed patients plan of care and provided an AVS. The Intermediate Care Plan ( asthma action plan, low back pain action plan, and migraine action plan) for Shanae meets the Care Plan requirement. This Care Plan has been established and reviewed with the Patient.    Counseling Resources:  ATP IV Guidelines  Pooled Cohorts Equation Calculator  Breast Cancer Risk Calculator  FRAX Risk " Assessment  ICSI Preventive Guidelines  Dietary Guidelines for Americans, 2010  USDA's MyPlate  ASA Prophylaxis  Lung CA Screening    Shona Jasso MD  [facility]    Identified Health Risks:

## 2019-11-05 NOTE — NURSING NOTE
"Chief Complaint   Patient presents with     Physical     /68 (BP Location: Right arm, Patient Position: Sitting, Cuff Size: Adult Regular)   Pulse 86   Temp 97.5  F (36.4  C) (Oral)   Resp 12   Ht 1.568 m (5' 1.75\")   Wt 55.8 kg (123 lb)   SpO2 100%   Breastfeeding? No   BMI 22.68 kg/m   Estimated body mass index is 22.68 kg/m  as calculated from the following:    Height as of this encounter: 1.568 m (5' 1.75\").    Weight as of this encounter: 55.8 kg (123 lb).  bp completed using cuff size: regular       Health Maintenance addressed:  Mammogram and Colonoscopy/FIT    Possibly completing today per provider review.    Jimmy Hylton CMA, MA     "

## 2019-11-06 DIAGNOSIS — E78.5 HYPERLIPIDEMIA LDL GOAL <130: ICD-10-CM

## 2019-11-06 LAB — DEPRECATED CALCIDIOL+CALCIFEROL SERPL-MC: 31 UG/L (ref 20–75)

## 2019-11-07 RX ORDER — ROSUVASTATIN CALCIUM 5 MG/1
TABLET, COATED ORAL
Start: 2019-11-07

## 2019-11-07 NOTE — TELEPHONE ENCOUNTER
"Crestor 5MG  Last Written Prescription Date:  11/05/2019  Last Fill Quantity: 90,  # refills: 1   Last office visit: 11/5/2019 with prescribing provider:  Yes    Future Office Visit:      Requested Prescriptions   Pending Prescriptions Disp Refills     rosuvastatin (CRESTOR) 5 MG tablet [Pharmacy Med Name: ROSUVASTATIN 5MG TABLETS] 90 tablet 1     Sig: TAKE 1 TABLET(5 MG) BY MOUTH DAILY       Statins Protocol Failed - 11/6/2019 12:36 PM        Failed - LDL on file in past 12 months     Recent Labs   Lab Test 07/13/18  0931   *             Passed - No abnormal creatine kinase in past 12 months     Recent Labs   Lab Test 02/11/15  1406   CKT 39                Passed - Recent (12 mo) or future (30 days) visit within the authorizing provider's specialty     Patient has had an office visit with the authorizing provider or a provider within the authorizing providers department within the previous 12 mos or has a future within next 30 days. See \"Patient Info\" tab in inbasket, or \"Choose Columns\" in Meds & Orders section of the refill encounter.              Passed - Medication is active on med list        Passed - Patient is age 18 or older        Passed - No active pregnancy on record        Passed - No positive pregnancy test in past 12 months          "

## 2019-11-15 DIAGNOSIS — Z12.11 ENCOUNTER FOR SCREENING FOR MALIGNANT NEOPLASM OF COLON: ICD-10-CM

## 2019-11-15 PROCEDURE — 82274 ASSAY TEST FOR BLOOD FECAL: CPT | Performed by: FAMILY MEDICINE

## 2019-11-22 LAB — HEMOCCULT STL QL IA: NEGATIVE

## 2020-02-17 ENCOUNTER — HOSPITAL ENCOUNTER (OUTPATIENT)
Dept: MAMMOGRAPHY | Facility: CLINIC | Age: 65
Discharge: HOME | End: 2020-02-17
Attending: FAMILY MEDICINE | Admitting: FAMILY MEDICINE
Payer: MEDICARE

## 2020-02-17 DIAGNOSIS — Z12.31 VISIT FOR SCREENING MAMMOGRAM: ICD-10-CM

## 2020-02-17 PROCEDURE — 77063 BREAST TOMOSYNTHESIS BI: CPT

## 2020-05-02 DIAGNOSIS — E78.5 HYPERLIPIDEMIA LDL GOAL <130: ICD-10-CM

## 2020-05-04 RX ORDER — ROSUVASTATIN CALCIUM 5 MG/1
TABLET, COATED ORAL
Qty: 90 TABLET | Refills: 1 | Status: SHIPPED | OUTPATIENT
Start: 2020-05-04 | End: 2020-11-03

## 2020-05-04 NOTE — TELEPHONE ENCOUNTER
"Routing refill request to provider for review/approval because:  Labs not current:  Overdue for yearly lipids  Tonja MARMOLEJO RN    Last Written Prescription Date:  11/5/2019  Last Fill Quantity: 90,  # refills: 1   Last office visit: 11/5/2019 with prescribing provider:     Future Office Visit:        Requested Prescriptions   Pending Prescriptions Disp Refills     rosuvastatin (CRESTOR) 5 MG tablet [Pharmacy Med Name: ROSUVASTATIN 5MG TABLETS] 90 tablet 1     Sig: TAKE 1 TABLET BY MOUTH DAILY       Statins Protocol Failed - 5/2/2020 10:18 AM        Failed - LDL on file in past 12 months     Recent Labs   Lab Test 07/13/18  0931   *             Passed - No abnormal creatine kinase in past 12 months     Recent Labs   Lab Test 02/11/15  1406   CKT 39                Passed - Recent (12 mo) or future (30 days) visit within the authorizing provider's specialty     Patient has had an office visit with the authorizing provider or a provider within the authorizing providers department within the previous 12 mos or has a future within next 30 days. See \"Patient Info\" tab in inbasket, or \"Choose Columns\" in Meds & Orders section of the refill encounter.              Passed - Medication is active on med list        Passed - Patient is age 18 or older        Passed - No active pregnancy on record        Passed - No positive pregnancy test in past 12 months           "

## 2020-11-02 DIAGNOSIS — E78.5 HYPERLIPIDEMIA LDL GOAL <130: ICD-10-CM

## 2020-11-03 RX ORDER — ROSUVASTATIN CALCIUM 5 MG/1
TABLET, COATED ORAL
Qty: 30 TABLET | Refills: 0 | Status: SHIPPED | OUTPATIENT
Start: 2020-11-03 | End: 2021-03-02

## 2020-11-03 NOTE — TELEPHONE ENCOUNTER
Medication is being filled for 1 time refill only due to:  Patient needs to be seen because pt is due for fasting physical.

## 2021-01-10 ENCOUNTER — HEALTH MAINTENANCE LETTER (OUTPATIENT)
Age: 66
End: 2021-01-10

## 2021-03-10 ENCOUNTER — OFFICE VISIT (OUTPATIENT)
Dept: FAMILY MEDICINE | Facility: CLINIC | Age: 66
End: 2021-03-10
Payer: MEDICARE

## 2021-03-10 VITALS
BODY MASS INDEX: 22.87 KG/M2 | WEIGHT: 124.3 LBS | HEART RATE: 80 BPM | TEMPERATURE: 97.2 F | HEIGHT: 62 IN | SYSTOLIC BLOOD PRESSURE: 124 MMHG | DIASTOLIC BLOOD PRESSURE: 78 MMHG | RESPIRATION RATE: 20 BRPM | OXYGEN SATURATION: 98 %

## 2021-03-10 DIAGNOSIS — E55.9 HYPOVITAMINOSIS D: ICD-10-CM

## 2021-03-10 DIAGNOSIS — Z12.11 COLON CANCER SCREENING: ICD-10-CM

## 2021-03-10 DIAGNOSIS — M35.3 PMR (POLYMYALGIA RHEUMATICA) (H): ICD-10-CM

## 2021-03-10 DIAGNOSIS — Z00.00 ENCOUNTER FOR MEDICARE ANNUAL WELLNESS EXAM: Primary | ICD-10-CM

## 2021-03-10 DIAGNOSIS — E78.5 HYPERLIPIDEMIA LDL GOAL <130: ICD-10-CM

## 2021-03-10 LAB — DEPRECATED CALCIDIOL+CALCIFEROL SERPL-MC: 26 UG/L (ref 20–75)

## 2021-03-10 PROCEDURE — 90715 TDAP VACCINE 7 YRS/> IM: CPT | Performed by: FAMILY MEDICINE

## 2021-03-10 PROCEDURE — 90471 IMMUNIZATION ADMIN: CPT | Performed by: FAMILY MEDICINE

## 2021-03-10 PROCEDURE — G0439 PPPS, SUBSEQ VISIT: HCPCS | Performed by: FAMILY MEDICINE

## 2021-03-10 PROCEDURE — 36415 COLL VENOUS BLD VENIPUNCTURE: CPT | Performed by: FAMILY MEDICINE

## 2021-03-10 PROCEDURE — 82306 VITAMIN D 25 HYDROXY: CPT | Performed by: FAMILY MEDICINE

## 2021-03-10 PROCEDURE — 80053 COMPREHEN METABOLIC PANEL: CPT | Performed by: FAMILY MEDICINE

## 2021-03-10 PROCEDURE — 80061 LIPID PANEL: CPT | Performed by: FAMILY MEDICINE

## 2021-03-10 PROCEDURE — 99213 OFFICE O/P EST LOW 20 MIN: CPT | Mod: 25 | Performed by: FAMILY MEDICINE

## 2021-03-10 RX ORDER — ROSUVASTATIN CALCIUM 5 MG/1
5 TABLET, COATED ORAL DAILY
Qty: 90 TABLET | Refills: 1 | Status: SHIPPED | OUTPATIENT
Start: 2021-03-10 | End: 2021-12-20

## 2021-03-10 ASSESSMENT — ACTIVITIES OF DAILY LIVING (ADL): CURRENT_FUNCTION: NO ASSISTANCE NEEDED

## 2021-03-10 ASSESSMENT — MIFFLIN-ST. JEOR: SCORE: 1052.07

## 2021-03-10 NOTE — PATIENT INSTRUCTIONS
Patient Education   Personalized Prevention Plan  You are due for the preventive services outlined below.  Your care team is available to assist you in scheduling these services.  If you have already completed any of these items, please share that information with your care team to update in your medical record.  Health Maintenance Due   Topic Date Due     Zoster (Shingles) Vaccine (2 of 3) 07/03/2014     Diptheria Tetanus Pertussis (DTAP/TDAP/TD) Vaccine (3 - Td) 02/16/2020     Annual Wellness Visit  11/05/2020     FALL RISK ASSESSMENT  11/05/2020     Colorectal Cancer Screening  11/15/2020     Pneumococcal Vaccine (2 of 2 - PPSV23) 11/05/2020

## 2021-03-10 NOTE — PROGRESS NOTES
"SUBJECTIVE:   Shanae Giang is a 65 year old female who presents for Preventive Visit.    Patient has been advised of split billing requirements and indicates understanding: Yes   Are you in the first 12 months of your Medicare coverage?  No    Healthy Habits:     In general, how would you rate your overall health?  Good    Frequency of exercise:  2-3 days/week    Duration of exercise:  Less than 15 minutes    Do you usually eat at least 4 servings of fruit and vegetables a day, include whole grains    & fiber and avoid regularly eating high fat or \"junk\" foods?  Yes    Taking medications regularly:  Yes    Medication side effects:  None    Ability to successfully perform activities of daily living:  No assistance needed    Home Safety:  No safety concerns identified    Hearing Impairment:  No hearing concerns    In the past 6 months, have you been bothered by leaking of urine?  No    In general, how would you rate your overall mental or emotional health?  Excellent      PHQ-2 Total Score: 0    Additional concerns today:  No    1) hypercholesterolemia , she has been on the crestor 5 mg and doing well on this , no side effects , fasting for lipids check , Pt requesting 90# rosuvastatin for next rx.     2) Pt due for tetanus booster, also would like cologuard  test today.   3) she has PMR but seems to be under control , no symptoms , doing well , no need to check inflammatory markers now   4) has had low vit d ad needs to check again     Do you feel safe in your environment? Yes    Have you ever done Advance Care Planning? (For example, a Health Directive, POLST, or a discussion with a medical provider or your loved ones about your wishes): Yes, patient states has an Advance Care Planning document and will bring a copy to the clinic.       Fall risk  Fallen 2 or more times in the past year?: No  Any fall with injury in the past year?: No    Cognitive Screening   1) Repeat 3 items (Leader, Season, Table)    2) Clock " draw: NORMAL  3) 3 item recall: Recalls 3 objects  Results: 3 items recalled: COGNITIVE IMPAIRMENT LESS LIKELY    Mini-CogTM Copyright S [employer]. Licensed by the author for use in [facility]; reprinted with permission (marin@[facility]). All rights reserved.      Do you have sleep apnea, excessive snoring or daytime drowsiness?: no    Reviewed and updated as needed this visit by clinical staff  Tobacco  Allergies  Meds   Med Hx  Surg Hx  Fam Hx  Soc Hx        Reviewed and updated as needed this visit by Provider                Social History     Tobacco Use     Smoking status: Never Smoker     Smokeless tobacco: Never Used   Substance Use Topics     Alcohol use: Yes     Alcohol/week: 0.0 - 1.0 standard drinks     Comment: Very Rarely-3 a year     If you drink alcohol do you typically have >3 drinks per day or >7 drinks per week? No    Alcohol Use 3/10/2021   Prescreen: >3 drinks/day or >7 drinks/week? Not Applicable   Prescreen: >3 drinks/day or >7 drinks/week? -   No flowsheet data found.            Current providers sharing in care for this patient include:   Patient Care Team:  Shona Jasso MD as PCP - General (Family Practice)  Shona Jasso MD as Assigned PCP    The following health maintenance items are reviewed in Epic and correct as of today:  Health Maintenance   Topic Date Due     ZOSTER IMMUNIZATION (2 of 3) 07/03/2014     DTAP/TDAP/TD IMMUNIZATION (3 - Td) 02/16/2020     FALL RISK ASSESSMENT  11/05/2020     COLORECTAL CANCER SCREENING  11/15/2020     MAMMO SCREENING  02/17/2022     MEDICARE ANNUAL WELLNESS VISIT  03/10/2022     LIPID  07/13/2023     ADVANCE CARE PLANNING  11/05/2024     DEXA  02/13/2033     HEPATITIS C SCREENING  Completed     PHQ-2  Completed     INFLUENZA VACCINE  Completed     Pneumococcal Vaccine: 65+ Years  Completed     COVID-19 Vaccine  Completed     Pneumococcal Vaccine: Pediatrics (0 to 5 Years) and At-Risk Patients (6 to 64 Years)  Aged Out     IPV IMMUNIZATION   Aged Out     MENINGITIS IMMUNIZATION  Aged Out     HEPATITIS B IMMUNIZATION  Aged Out     Lab work is in process  Labs reviewed in EPIC  BP Readings from Last 3 Encounters:   03/10/21 124/78   19 138/68   18 130/75    Wt Readings from Last 3 Encounters:   03/10/21 56.4 kg (124 lb 4.8 oz)   19 55.8 kg (123 lb)   18 58.4 kg (128 lb 12.8 oz)                  Patient Active Problem List   Diagnosis     Osteoarthrosis, unspecified whether generalized or localized, pelvic region and thigh     Disorder of bone and cartilage     HYPERLIPIDEMIA LDL GOAL <130     Elevated C-reactive protein (CRP)     PMR (polymyalgia rheumatica) (H)     Osteopenia     Tinea corporis     Lichen sclerosus     Past Surgical History:   Procedure Laterality Date     [facility] NONSPECIFIC PROCEDURE  89    s/p L hip replacement     [facility] NONSPECIFIC PROCEDURE  Age 4/    Rotational osteotomy of Left hip       Social History     Tobacco Use     Smoking status: Never Smoker     Smokeless tobacco: Never Used   Substance Use Topics     Alcohol use: Yes     Alcohol/week: 0.0 - 1.0 standard drinks     Comment: Very Rarely-3 a year     Family History   Problem Relation Age of Onset     Heart Disease Mother         CHF     Hypertension Mother      Alcohol/Drug Father      Depression Father      Gastrointestinal Disease Father         Had to have part of stomach removed-many ulcers     C.A.D. Maternal Grandmother         CHF     Hypertension Maternal Grandmother      C.A.D. Maternal Grandfather         Had heart surgery     Heart Disease Maternal Grandfather         MI- first at age 30,  of one in late 50's     Heart Disease Paternal Grandmother         ?     Alcohol/Drug Paternal Grandmother      Hypertension Brother      Alcohol/Drug Sister      Alcohol/Drug Brother      Depression Sister      Gastrointestinal Disease Sister         Esophageal Reflux, ulcers     Psychotic Disorder Sister      Psychotic Disorder Sister       "Depression Brother          Current Outpatient Medications   Medication Sig Dispense Refill     Acetaminophen (TYLENOL PO) Take 500 mg by mouth every 6 hours as needed for mild pain or fever       rosuvastatin (CRESTOR) 5 MG tablet Take 1 tablet (5 mg) by mouth daily 90 tablet 1     ciclopirox (LOPROX) 0.77 % cream Apply topically 2 times daily 30 g 1     clobetasol (TEMOVATE) 0.05 % ointment        glucosamine-chondroitin 500-400 MG CAPS Take 1 capsule by mouth 2 times daily        MULTIVITAMIN TABS   OR TAKE ONE TABLET BY MOUTH DAILY       UNABLE TO FIND Take 1 tablet by mouth daily MEDICATION NAME: Tumeric       VITAMIN D, CHOLECALCIFEROL, PO Take 1,000 Units by mouth daily       Allergies   Allergen Reactions     Azithromycin Diarrhea     Recent Labs   Lab Test 11/05/19  0805 07/13/18  0931 05/01/18  0922 02/09/18  1127 02/11/15  1406 02/11/15  1406   A1C  --   --  5.5 5.5  --   --    LDL  --  111* 185* 112*   < >  --    HDL  --  58 50 55   < >  --    TRIG  --  78 138 82   < >  --    ALT 44 42 48 33   < > 53*   CR 0.66 0.73 0.68 0.69   < > 0.70   GFRESTIMATED >90 80 87 86   < > 85   GFRESTBLACK >90 >90 >90 >90   < > >90  African American GFR Calc     POTASSIUM 4.0 4.5 3.9 4.2  --  3.9   TSH 3.16  --   --   --   --  1.23    < > = values in this interval not displayed.      Mammogram Screening: Recommended mammography every 1-2 years with patient discussion and risk factor consideration    Review of Systems  Constitutional, HEENT, cardiovascular, pulmonary, GI, , musculoskeletal, neuro, skin, endocrine and psych systems are negative, except as otherwise noted.    OBJECTIVE:   Resp 20   Ht 1.575 m (5' 2\")   Wt 56.4 kg (124 lb 4.8 oz)   BMI 22.73 kg/m   Estimated body mass index is 22.73 kg/m  as calculated from the following:    Height as of this encounter: 1.575 m (5' 2\").    Weight as of this encounter: 56.4 kg (124 lb 4.8 oz).  Physical Exam  GENERAL: healthy, alert and no distress  EYES: Eyes grossly " normal to inspection, PERRL and conjunctivae and sclerae normal  HENT: ear canals and TM's normal, nose and mouth without ulcers or lesions  NECK: no adenopathy, no asymmetry, masses, or scars and thyroid normal to palpation  RESP: lungs clear to auscultation - no rales, rhonchi or wheezes  BREAST: normal without masses, tenderness or nipple discharge and no palpable axillary masses or adenopathy  CV: regular rate and rhythm, normal S1 S2, no S3 or S4, no murmur, click or rub, no peripheral edema and peripheral pulses strong  ABDOMEN: soft, nontender, no hepatosplenomegaly, no masses and bowel sounds normal  MS: no gross musculoskeletal defects noted, no edema  SKIN: no suspicious lesions or rashes  NEURO: Normal strength and tone, mentation intact and speech normal  PSYCH: mentation appears normal, affect normal/bright    Diagnostic Test Results:  Labs reviewed in Epic  No results found for this or any previous visit (from the past 24 hour(s)).    ASSESSMENT / PLAN:   1. Encounter for Medicare annual wellness exam  Discussed diet,calcium,exercise.Went over self breast exam.Thin prep was NOt  done.Eyes and teeth UTD.No immunizations needed today.See orders below for tests ordered and screening needed.    - TDAP VACCINE (Adacel, Boostrix)  [[id number]]    2. PMR (polymyalgia rheumatica) (H)  Stable currently , no need for labs     3. Hyperlipidemia LDL goal <130  Will check lipids today and she is on the crestor , doing well , no side effects , seems to be doing well   - Lipid panel reflex to direct LDL Fasting  - Comprehensive metabolic panel (BMP + Alb, Alk Phos, ALT, AST, Total. Bili, TP)  - rosuvastatin (CRESTOR) 5 MG tablet; Take 1 tablet (5 mg) by mouth daily  Dispense: 90 tablet; Refill: 1    4. Colon cancer screening  Will screen   - RONALDO([employer])    5. Hypovitaminosis D  Will check as she has had low vit D levels in the past   - Vitamin D Deficiency    Patient has been advised of split billing  "requirements and indicates understanding: Yes  COUNSELING:  Reviewed preventive health counseling, as reflected in patient instructions       Regular exercise       Healthy diet/nutrition       Vision screening       Hearing screening       Dental care       Osteoporosis prevention/bone health    Estimated body mass index is 22.73 kg/m  as calculated from the following:    Height as of this encounter: 1.575 m (5' 2\").    Weight as of this encounter: 56.4 kg (124 lb 4.8 oz).        She reports that she has never smoked. She has never used smokeless tobacco.      Appropriate preventive services were discussed with this patient, including applicable screening as appropriate for cardiovascular disease, diabetes, osteopenia/osteoporosis, and glaucoma.  As appropriate for age/gender, discussed screening for colorectal cancer, prostate cancer, breast cancer, and cervical cancer. Checklist reviewing preventive services available has been given to the patient.    Reviewed patients plan of care and provided an AVS. The Intermediate Care Plan ( asthma action plan, low back pain action plan, and migraine action plan) for Shanae meets the Care Plan requirement. This Care Plan has been established and reviewed with the Patient.    Counseling Resources:  ATP IV Guidelines  Pooled Cohorts Equation Calculator  Breast Cancer Risk Calculator  Breast Cancer: Medication to Reduce Risk  FRAX Risk Assessment  ICSI Preventive Guidelines  Dietary Guidelines for Americans, 2010  [employer]'s MyPlate  ASA Prophylaxis  Lung CA Screening    Shona Jasso MD  [facility]    Identified Health Risks:  "

## 2021-03-10 NOTE — NURSING NOTE
Prior to immunization administration, verified patients identity using patient s name and date of birth. Please see Immunization Activity for additional information.     Screening Questionnaire for Adult Immunization    Are you sick today?   No   Do you have allergies to medications, food, a vaccine component or latex?   No   Have you ever had a serious reaction after receiving a vaccination?   No   Do you have a long-term health problem with heart, lung, kidney, or metabolic disease (e.g., diabetes), asthma, a blood disorder, no spleen, complement component deficiency, a cochlear implant, or a spinal fluid leak?  Are you on long-term aspirin therapy?   No   Do you have cancer, leukemia, HIV/AIDS, or any other immune system problem?   No   Do you have a parent, brother, or sister with an immune system problem?   No   In the past 3 months, have you taken medications that affect  your immune system, such as prednisone, other steroids, or anticancer drugs; drugs for the treatment of rheumatoid arthritis, Crohn s disease, or psoriasis; or have you had radiation treatments?   No   Have you had a seizure, or a brain or other nervous system problem?   No   During the past year, have you received a transfusion of blood or blood    products, or been given immune (gamma) globulin or antiviral drug?   No   For women: Are you pregnant or is there a chance you could become       pregnant during the next month?   No   Have you received any vaccinations in the past 4 weeks?   No     Immunization questionnaire answers were all negative.        Per orders of Dr. Jasso, injection of Adacel given by Juana Simental MA. Patient instructed to remain in clinic for 15 minutes afterwards, and to report any adverse reaction to me immediately.       Screening performed by Juana Simental MA on 3/10/2021 at 9:10 AM.  Juana Simental MA on 3/10/2021 at 9:10 AM

## 2021-03-11 LAB
ALBUMIN SERPL-MCNC: 4.2 G/DL (ref 3.4–5)
ALP SERPL-CCNC: 68 U/L (ref 40–150)
ALT SERPL W P-5'-P-CCNC: 46 U/L (ref 0–50)
ANION GAP SERPL CALCULATED.3IONS-SCNC: 3 MMOL/L (ref 3–14)
AST SERPL W P-5'-P-CCNC: 22 U/L (ref 0–45)
BILIRUB SERPL-MCNC: 0.4 MG/DL (ref 0.2–1.3)
BUN SERPL-MCNC: 16 MG/DL (ref 7–30)
CALCIUM SERPL-MCNC: 9 MG/DL (ref 8.5–10.1)
CHLORIDE SERPL-SCNC: 108 MMOL/L (ref 94–109)
CHOLEST SERPL-MCNC: 186 MG/DL
CO2 SERPL-SCNC: 27 MMOL/L (ref 20–32)
CREAT SERPL-MCNC: 0.79 MG/DL (ref 0.52–1.04)
GFR SERPL CREATININE-BSD FRML MDRD: 77 ML/MIN/{1.73_M2}
GLUCOSE SERPL-MCNC: 108 MG/DL (ref 70–99)
HDLC SERPL-MCNC: 59 MG/DL
LDLC SERPL CALC-MCNC: 113 MG/DL
NONHDLC SERPL-MCNC: 127 MG/DL
POTASSIUM SERPL-SCNC: 4 MMOL/L (ref 3.4–5.3)
PROT SERPL-MCNC: 7.8 G/DL (ref 6.8–8.8)
SODIUM SERPL-SCNC: 138 MMOL/L (ref 133–144)
TRIGL SERPL-MCNC: 69 MG/DL

## 2021-03-23 LAB — COLOGUARD-ABSTRACT: NEGATIVE

## 2021-03-25 ENCOUNTER — HOSPITAL ENCOUNTER (OUTPATIENT)
Dept: MAMMOGRAPHY | Facility: CLINIC | Age: 66
Discharge: HOME | End: 2021-03-25
Attending: FAMILY MEDICINE | Admitting: FAMILY MEDICINE
Payer: MEDICARE

## 2021-03-25 DIAGNOSIS — Z12.31 SCREENING MAMMOGRAM, ENCOUNTER FOR: ICD-10-CM

## 2021-03-25 PROCEDURE — 77063 BREAST TOMOSYNTHESIS BI: CPT

## 2021-10-23 ENCOUNTER — HEALTH MAINTENANCE LETTER (OUTPATIENT)
Age: 66
End: 2021-10-23

## 2021-12-20 ENCOUNTER — APPOINTMENT (OUTPATIENT)
Dept: URGENT CARE | Facility: CLINIC | Age: 66
End: 2021-12-20
Payer: MEDICARE

## 2022-03-29 ENCOUNTER — MYC MEDICAL ADVICE (OUTPATIENT)
Dept: FAMILY MEDICINE | Facility: CLINIC | Age: 67
End: 2022-03-29
Payer: MEDICARE

## 2022-03-29 DIAGNOSIS — E78.5 HYPERLIPIDEMIA LDL GOAL <130: ICD-10-CM

## 2022-03-29 RX ORDER — ROSUVASTATIN CALCIUM 5 MG/1
TABLET, COATED ORAL
Qty: 30 TABLET | Refills: 0 | Status: SHIPPED | OUTPATIENT
Start: 2022-03-29 | End: 2022-04-29

## 2022-03-29 NOTE — TELEPHONE ENCOUNTER
Routing refill request to provider for review/approval because:  Patient needs to be seen because it has been more than 1 year since last office visit.  Due for physical and fasting labs.    Karey Mead RN

## 2022-04-26 NOTE — PROGRESS NOTES
"SUBJECTIVE:   Shanae Giang is a 66 year old female who presents for Preventive Visit.      Patient has been advised of split billing requirements and indicates understanding: Yes  Are you in the first 12 months of your Medicare coverage?  No    Healthy Habits:     In general, how would you rate your overall health?  Good    Frequency of exercise:  1 day/week    Duration of exercise:  Less than 15 minutes    Do you usually eat at least 4 servings of fruit and vegetables a day, include whole grains    & fiber and avoid regularly eating high fat or \"junk\" foods?  No    Taking medications regularly:  Yes    Medication side effects:  None    Ability to successfully perform activities of daily living:  No assistance needed    Home Safety:  No safety concerns identified    Hearing Impairment:  No hearing concerns    In the past 6 months, have you been bothered by leaking of urine?  No    In general, how would you rate your overall mental or emotional health?  Excellent      PHQ-2 Total Score: 0    Additional concerns today:  No       Do you feel safe in your environment? Yes    Have you ever done Advance Care Planning? (For example, a Health Directive, POLST, or a discussion with a medical provider or your loved ones about your wishes): No, advance care planning information given to patient to review.  Patient declined advance care planning discussion at this time.       Fall risk  Fallen 2 or more times in the past year?: No  Any fall with injury in the past year?: No    Cognitive Screening   1) Repeat 3 items (Leader, Season, Table)    2) Clock draw: NORMAL  3) 3 item recall: Recalls 3 objects  Results: 3 items recalled: COGNITIVE IMPAIRMENT LESS LIKELY    Mini-CogTM Sakshi Vaca. Licensed by the author for use in [facility]; reprinted with permission (marin@.[email or web address]). All rights reserved.      Do you have sleep apnea, excessive snoring or daytime drowsiness?: no    Reviewed and updated as needed this " visit by clinical staff                    Reviewed and updated as needed this visit by Provider                   Social History     Tobacco Use     Smoking status: Never Smoker     Smokeless tobacco: Never Used   Substance Use Topics     Alcohol use: Yes     Alcohol/week: 0.0 - 1.0 standard drinks     Comment: Very Rarely-3 a year     If you drink alcohol do you typically have >3 drinks per day or >7 drinks per week? No    Alcohol Use 3/10/2021   Prescreen: >3 drinks/day or >7 drinks/week? Not Applicable   Prescreen: >3 drinks/day or >7 drinks/week? -   No flowsheet data found.        PROBLEMS TO ADD ON...  1) elevated  over 70 at home but when coming to the doctors office is stressed out   2) high cholesterol but she is on a statin and doing well on this   3) PMR and no issues currently with her   Current providers sharing in care for this patient include:   Patient Care Team:  Shona Jasso MD as PCP - General (Family Practice)  Shona Jasso MD as Assigned PCP    The following health maintenance items are reviewed in Epic and correct as of today:  Health Maintenance Due   Topic Date Due     ANNUAL REVIEW OF HM ORDERS  Never done     ZOSTER IMMUNIZATION (2 of 3) 07/03/2014     PHQ-2 (once per calendar year)  01/01/2022     MEDICARE ANNUAL WELLNESS VISIT  03/10/2022     FALL RISK ASSESSMENT  03/10/2022     Lab work is in process  Labs reviewed in EPIC  BP Readings from Last 3 Encounters:   04/29/22 130/70   03/10/21 124/78   11/05/19 138/68    Wt Readings from Last 3 Encounters:   04/29/22 57.2 kg (126 lb)   03/10/21 56.4 kg (124 lb 4.8 oz)   11/05/19 55.8 kg (123 lb)                  Patient Active Problem List   Diagnosis     Osteoarthrosis, unspecified whether generalized or localized, pelvic region and thigh     Disorder of bone and cartilage     HYPERLIPIDEMIA LDL GOAL <130     Elevated C-reactive protein (CRP)     PMR (polymyalgia rheumatica) (H)     Osteopenia     Tinea corporis     Lichen  sclerosus     Past Surgical History:   Procedure Laterality Date     [facility] NONSPECIFIC PROCEDURE  89    s/p L hip replacement     [facility] NONSPECIFIC PROCEDURE  Age 6/7    Rotational osteotomy of Left hip       Social History     Tobacco Use     Smoking status: Never Smoker     Smokeless tobacco: Never Used   Substance Use Topics     Alcohol use: Yes     Alcohol/week: 0.0 - 1.0 standard drinks     Comment: Very Rarely-3 a year     Family History   Problem Relation Age of Onset     Heart Disease Mother         CHF     Hypertension Mother      Alcohol/Drug Father      Depression Father      Gastrointestinal Disease Father         Had to have part of stomach removed-many ulcers     C.A.D. Maternal Grandmother         CHF     Hypertension Maternal Grandmother      C.A.D. Maternal Grandfather         Had heart surgery     Heart Disease Maternal Grandfather         MI- first at age 30,  of one in late 50's     Heart Disease Paternal Grandmother         ?     Alcohol/Drug Paternal Grandmother      Hypertension Brother      Alcohol/Drug Sister      Alcohol/Drug Brother      Depression Sister      Gastrointestinal Disease Sister         Esophageal Reflux, ulcers     Psychotic Disorder Sister      Psychotic Disorder Sister      Depression Brother          Current Outpatient Medications   Medication Sig Dispense Refill     rosuvastatin (CRESTOR) 5 MG tablet TAKE 1 TABLET(5 MG) BY MOUTH DAILY 90 tablet 1     Acetaminophen (TYLENOL PO) Take 500 mg by mouth every 6 hours as needed for mild pain or fever       Allergies   Allergen Reactions     Azithromycin Diarrhea     Mammogram Screening: Mammogram Screening: Recommended mammography every 1-2 years with patient discussion and risk factor consideration    Breast CA Risk Assessment (FHS-7) 3/10/2021   Do you have a family history of breast, colon, or ovarian cancer? No / Unknown         Mammogram Screening: Recommended mammography every 1-2 years with patient discussion and  "risk factor consideration  Pertinent mammograms are reviewed under the imaging tab.    Review of Systems  Constitutional, HEENT, cardiovascular, pulmonary, GI, , musculoskeletal, neuro, skin, endocrine and psych systems are negative, except as otherwise noted.    OBJECTIVE:   There were no vitals taken for this visit. Estimated body mass index is 22.73 kg/m  as calculated from the following:    Height as of 3/10/21: 1.575 m (5' 2\").    Weight as of 3/10/21: 56.4 kg (124 lb 4.8 oz).  Physical Exam  GENERAL: healthy, alert and no distress  EYES: Eyes grossly normal to inspection, PERRL and conjunctivae and sclerae normal  HENT: ear canals and TM's normal, nose and mouth without ulcers or lesions  NECK: no adenopathy, no asymmetry, masses, or scars and thyroid normal to palpation  RESP: lungs clear to auscultation - no rales, rhonchi or wheezes  BREAST: normal without masses, tenderness or nipple discharge and no palpable axillary masses or adenopathy  CV: regular rate and rhythm, normal S1 S2, no S3 or S4, no murmur, click or rub, no peripheral edema and peripheral pulses strong  ABDOMEN: soft, nontender, no hepatosplenomegaly, no masses and bowel sounds normal  MS: no gross musculoskeletal defects noted, no edema  SKIN: no suspicious lesions or rashes  NEURO: Normal strength and tone, mentation intact and speech normal  PSYCH: mentation appears normal, affect normal/bright    Diagnostic Test Results:  Labs reviewed in Epic  Results for orders placed or performed in visit on 04/29/22   Lipid panel reflex to direct LDL Fasting     Status: Abnormal   Result Value Ref Range    Cholesterol 192 <200 mg/dL    Triglycerides 91 <150 mg/dL    Direct Measure HDL 62 >=50 mg/dL    LDL Cholesterol Calculated 112 (H) <=100 mg/dL    Non HDL Cholesterol 130 (H) <130 mg/dL    Patient Fasting > 8hrs? Yes     Narrative    Cholesterol  Desirable:  <200 mg/dL    Triglycerides  Normal:  Less than 150 mg/dL  Borderline High:  150-199 " mg/dL  High:  200-499 mg/dL  Very High:  Greater than or equal to 500 mg/dL    Direct Measure HDL  Female:  Greater than or equal to 50 mg/dL   Male:  Greater than or equal to 40 mg/dL    LDL Cholesterol  Desirable:  <100mg/dL  Above Desirable:  100-129 mg/dL   Borderline High:  130-159 mg/dL   High:  160-189 mg/dL   Very High:  >= 190 mg/dL    Non HDL Cholesterol  Desirable:  130 mg/dL  Above Desirable:  130-159 mg/dL  Borderline High:  160-189 mg/dL  High:  190-219 mg/dL  Very High:  Greater than or equal to 220 mg/dL   Comprehensive metabolic panel (BMP + Alb, Alk Phos, ALT, AST, Total. Bili, TP)     Status: Abnormal   Result Value Ref Range    Sodium 141 133 - 144 mmol/L    Potassium 4.4 3.4 - 5.3 mmol/L    Chloride 108 94 - 109 mmol/L    Carbon Dioxide (CO2) 26 20 - 32 mmol/L    Anion Gap 7 3 - 14 mmol/L    Urea Nitrogen 16 7 - 30 mg/dL    Creatinine 0.74 0.52 - 1.04 mg/dL    Calcium 9.8 8.5 - 10.1 mg/dL    Glucose 110 (H) 70 - 99 mg/dL    Alkaline Phosphatase 60 40 - 150 U/L    AST 26 0 - 45 U/L    ALT 64 (H) 0 - 50 U/L    Protein Total 7.9 6.8 - 8.8 g/dL    Albumin 4.5 3.4 - 5.0 g/dL    Bilirubin Total 0.5 0.2 - 1.3 mg/dL    GFR Estimate 88 >60 mL/min/1.73m2   Hemoglobin A1c     Status: Abnormal   Result Value Ref Range    Hemoglobin A1C 5.7 (H) 0.0 - 5.6 %   Vitamin D Deficiency     Status: Normal   Result Value Ref Range    Vitamin D, Total (25-Hydroxy) 38 20 - 75 ug/L    Narrative    Season, race, dietary intake, and treatment affect the concentration of 25-hydroxy-Vitamin D. Values may decrease during winter months and increase during summer months. Values 20-29 ug/L may indicate Vitamin D insufficiency and values <20 ug/L may indicate Vitamin D deficiency.    Vitamin D determination is routinely performed by an immunoassay specific for 25 hydroxyvitamin D3.  If an individual is on vitamin D2(ergocalciferol) supplementation, please specify 25 OH vitamin D2 and D3 level determination by LCMSMS test  "[id number].         ASSESSMENT / PLAN:   (Z00.00) Encounter for routine adult health examination without abnormal findings  (primary encounter diagnosis)  Comment: Discussed diet,calcium,exercise.Went over self breast exam.Thin prep was NOTdone.Eyes and teeth UTD.No immunizations needed today.See orders below for tests ordered and screening needed.    Plan: is fasting for labs today     (M35.3) PMR (polymyalgia rheumatica) (H)  Comment: she has been stable ( in remission without any prednisone since 2016 when she got off the prednisone ) without any recent exacerbations on this   Plan: in the past she saw rheumatology     (E78.5) Hyperlipidemia LDL goal <130  Comment: is on crestor 5 mg daily , doing well , no side effects   Plan: Lipid panel reflex to direct LDL Fasting,         Comprehensive metabolic panel (BMP + Alb, Alk         Phos, ALT, AST, Total. Bili, TP), rosuvastatin         (CRESTOR) 5 MG tablet        Will check fasting lipids today     (E55.9) Hypovitaminosis D  Comment: will check   Plan: Vitamin D Deficiency        She is taking supplements vit D     (R73.01) Elevated fasting glucose  Comment: will check as glucose has been mildly elevated in the past   Plan: Hemoglobin A1c              Patient has been advised of split billing requirements and indicates understanding: Yes    COUNSELING:  Reviewed preventive health counseling, as reflected in patient instructions       Regular exercise       Healthy diet/nutrition       Vision screening       Hearing screening       Dental care       Bladder control       Fall risk prevention       Osteoporosis prevention/bone health    Estimated body mass index is 22.73 kg/m  as calculated from the following:    Height as of 3/10/21: 1.575 m (5' 2\").    Weight as of 3/10/21: 56.4 kg (124 lb 4.8 oz).        She reports that she has never smoked. She has never used smokeless tobacco.      Appropriate preventive services were discussed with this patient, including " applicable screening as appropriate for cardiovascular disease, diabetes, osteopenia/osteoporosis, and glaucoma.  As appropriate for age/gender, discussed screening for colorectal cancer, prostate cancer, breast cancer, and cervical cancer. Checklist reviewing preventive services available has been given to the patient.    Reviewed patients plan of care and provided an AVS. The Intermediate Care Plan ( asthma action plan, low back pain action plan, and migraine action plan) for Shanae meets the Care Plan requirement. This Care Plan has been established and reviewed with the Patient.    Counseling Resources:  ATP IV Guidelines  Pooled Cohorts Equation Calculator  Breast Cancer Risk Calculator  Breast Cancer: Medication to Reduce Risk  FRAX Risk Assessment  ICSI Preventive Guidelines  Dietary Guidelines for Americans, 2010  USDA's MyPlate  ASA Prophylaxis  Lung CA Screening    Shona Jasso MD  [facility]    Identified Health Risks:

## 2022-04-29 ENCOUNTER — OFFICE VISIT (OUTPATIENT)
Dept: FAMILY MEDICINE | Facility: CLINIC | Age: 67
End: 2022-04-29
Payer: MEDICARE

## 2022-04-29 VITALS
HEIGHT: 62 IN | OXYGEN SATURATION: 99 % | WEIGHT: 126 LBS | SYSTOLIC BLOOD PRESSURE: 130 MMHG | BODY MASS INDEX: 23.19 KG/M2 | DIASTOLIC BLOOD PRESSURE: 70 MMHG | TEMPERATURE: 97.8 F | HEART RATE: 93 BPM | RESPIRATION RATE: 16 BRPM

## 2022-04-29 DIAGNOSIS — M35.3 PMR (POLYMYALGIA RHEUMATICA) (H): ICD-10-CM

## 2022-04-29 DIAGNOSIS — Z00.00 ENCOUNTER FOR ROUTINE ADULT HEALTH EXAMINATION WITHOUT ABNORMAL FINDINGS: Primary | ICD-10-CM

## 2022-04-29 DIAGNOSIS — E78.5 HYPERLIPIDEMIA LDL GOAL <130: ICD-10-CM

## 2022-04-29 DIAGNOSIS — E55.9 HYPOVITAMINOSIS D: ICD-10-CM

## 2022-04-29 DIAGNOSIS — R73.01 ELEVATED FASTING GLUCOSE: ICD-10-CM

## 2022-04-29 LAB — HBA1C MFR BLD: 5.7 % (ref 0–5.6)

## 2022-04-29 PROCEDURE — 82306 VITAMIN D 25 HYDROXY: CPT | Performed by: FAMILY MEDICINE

## 2022-04-29 PROCEDURE — 80053 COMPREHEN METABOLIC PANEL: CPT | Performed by: FAMILY MEDICINE

## 2022-04-29 PROCEDURE — 80061 LIPID PANEL: CPT | Performed by: FAMILY MEDICINE

## 2022-04-29 PROCEDURE — 83036 HEMOGLOBIN GLYCOSYLATED A1C: CPT | Performed by: FAMILY MEDICINE

## 2022-04-29 PROCEDURE — 36415 COLL VENOUS BLD VENIPUNCTURE: CPT | Performed by: FAMILY MEDICINE

## 2022-04-29 PROCEDURE — 99214 OFFICE O/P EST MOD 30 MIN: CPT | Mod: 25 | Performed by: FAMILY MEDICINE

## 2022-04-29 PROCEDURE — G0439 PPPS, SUBSEQ VISIT: HCPCS | Performed by: FAMILY MEDICINE

## 2022-04-29 RX ORDER — ROSUVASTATIN CALCIUM 5 MG/1
TABLET, COATED ORAL
Qty: 90 TABLET | Refills: 1 | Status: SHIPPED | OUTPATIENT
Start: 2022-04-29 | End: 2022-11-07

## 2022-04-29 ASSESSMENT — ENCOUNTER SYMPTOMS
HEMATURIA: 0
DYSURIA: 0
HEMATOCHEZIA: 0
MYALGIAS: 1
SORE THROAT: 0
HEADACHES: 0
HEARTBURN: 0
NERVOUS/ANXIOUS: 0
EYE PAIN: 0
NAUSEA: 0
BREAST MASS: 0
CHILLS: 0
DIZZINESS: 0
SHORTNESS OF BREATH: 0
ARTHRALGIAS: 0
FEVER: 0
PARESTHESIAS: 0
PALPITATIONS: 0
COUGH: 0
DIARRHEA: 0
ABDOMINAL PAIN: 0
WEAKNESS: 0
CONSTIPATION: 0
FREQUENCY: 0
JOINT SWELLING: 1

## 2022-04-29 ASSESSMENT — ACTIVITIES OF DAILY LIVING (ADL): CURRENT_FUNCTION: NO ASSISTANCE NEEDED

## 2022-04-29 NOTE — NURSING NOTE
"Chief Complaint   Patient presents with     Physical     initial /70   Pulse 93   Temp 97.8  F (36.6  C) (Tympanic)   Resp 16   Ht 1.562 m (5' 1.5\")   Wt 57.2 kg (126 lb)   SpO2 99%   BMI 23.42 kg/m   Estimated body mass index is 23.42 kg/m  as calculated from the following:    Height as of this encounter: 1.562 m (5' 1.5\").    Weight as of this encounter: 57.2 kg (126 lb).  BP completed using cuff size: regular.  L  arm      Health Maintenance that is potentially due pending provider review:  NONE    n/a    Dimas Gaviria ma  "

## 2022-04-30 LAB — DEPRECATED CALCIDIOL+CALCIFEROL SERPL-MC: 38 UG/L (ref 20–75)

## 2022-05-01 LAB
ALBUMIN SERPL-MCNC: 4.5 G/DL (ref 3.4–5)
ALP SERPL-CCNC: 60 U/L (ref 40–150)
ALT SERPL W P-5'-P-CCNC: 64 U/L (ref 0–50)
ANION GAP SERPL CALCULATED.3IONS-SCNC: 7 MMOL/L (ref 3–14)
AST SERPL W P-5'-P-CCNC: 26 U/L (ref 0–45)
BILIRUB SERPL-MCNC: 0.5 MG/DL (ref 0.2–1.3)
BUN SERPL-MCNC: 16 MG/DL (ref 7–30)
CALCIUM SERPL-MCNC: 9.8 MG/DL (ref 8.5–10.1)
CHLORIDE BLD-SCNC: 108 MMOL/L (ref 94–109)
CO2 SERPL-SCNC: 26 MMOL/L (ref 20–32)
CREAT SERPL-MCNC: 0.74 MG/DL (ref 0.52–1.04)
GFR SERPL CREATININE-BSD FRML MDRD: 88 ML/MIN/1.73M2
GLUCOSE BLD-MCNC: 110 MG/DL (ref 70–99)
POTASSIUM BLD-SCNC: 4.4 MMOL/L (ref 3.4–5.3)
PROT SERPL-MCNC: 7.9 G/DL (ref 6.8–8.8)
SODIUM SERPL-SCNC: 141 MMOL/L (ref 133–144)

## 2022-05-02 LAB
CHOLEST SERPL-MCNC: 192 MG/DL
FASTING STATUS PATIENT QL REPORTED: YES
HDLC SERPL-MCNC: 62 MG/DL
LDLC SERPL CALC-MCNC: 112 MG/DL
NONHDLC SERPL-MCNC: 130 MG/DL
TRIGL SERPL-MCNC: 91 MG/DL

## 2022-10-09 ENCOUNTER — HEALTH MAINTENANCE LETTER (OUTPATIENT)
Age: 67
End: 2022-10-09

## 2023-04-20 ENCOUNTER — PATIENT OUTREACH (OUTPATIENT)
Dept: CARE COORDINATION | Facility: CLINIC | Age: 68
End: 2023-04-20
Payer: MEDICARE

## 2023-05-23 ENCOUNTER — MYC MEDICAL ADVICE (OUTPATIENT)
Dept: FAMILY MEDICINE | Facility: CLINIC | Age: 68
End: 2023-05-23
Payer: MEDICARE

## 2023-05-23 ENCOUNTER — MYC REFILL (OUTPATIENT)
Dept: FAMILY MEDICINE | Facility: CLINIC | Age: 68
End: 2023-05-23
Payer: MEDICARE

## 2023-05-23 DIAGNOSIS — E78.5 HYPERLIPIDEMIA LDL GOAL <130: ICD-10-CM

## 2023-05-23 RX ORDER — ROSUVASTATIN CALCIUM 5 MG/1
5 TABLET, COATED ORAL DAILY
Qty: 30 TABLET | Refills: 0 | Status: SHIPPED | OUTPATIENT
Start: 2023-05-23 | End: 2023-05-23

## 2023-05-25 RX ORDER — ROSUVASTATIN CALCIUM 5 MG/1
5 TABLET, COATED ORAL DAILY
Qty: 30 TABLET | Refills: 0 | Status: SHIPPED | OUTPATIENT
Start: 2023-05-25 | End: 2023-06-21

## 2023-05-27 ENCOUNTER — HEALTH MAINTENANCE LETTER (OUTPATIENT)
Age: 68
End: 2023-05-27

## 2023-06-10 ENCOUNTER — HEALTH MAINTENANCE LETTER (OUTPATIENT)
Age: 68
End: 2023-06-10

## 2023-06-21 ENCOUNTER — OFFICE VISIT (OUTPATIENT)
Dept: FAMILY MEDICINE | Facility: CLINIC | Age: 68
End: 2023-06-21
Payer: MEDICARE

## 2023-06-21 VITALS
OXYGEN SATURATION: 99 % | WEIGHT: 122.7 LBS | RESPIRATION RATE: 18 BRPM | DIASTOLIC BLOOD PRESSURE: 80 MMHG | TEMPERATURE: 97.3 F | HEIGHT: 62 IN | SYSTOLIC BLOOD PRESSURE: 130 MMHG | BODY MASS INDEX: 22.58 KG/M2 | HEART RATE: 89 BPM

## 2023-06-21 DIAGNOSIS — Z78.0 ASYMPTOMATIC MENOPAUSAL STATE: ICD-10-CM

## 2023-06-21 DIAGNOSIS — Z12.31 VISIT FOR SCREENING MAMMOGRAM: ICD-10-CM

## 2023-06-21 DIAGNOSIS — M35.3 PMR (POLYMYALGIA RHEUMATICA) (H): ICD-10-CM

## 2023-06-21 DIAGNOSIS — E55.9 HYPOVITAMINOSIS D: ICD-10-CM

## 2023-06-21 DIAGNOSIS — Z00.00 ENCOUNTER FOR MEDICARE ANNUAL WELLNESS EXAM: Primary | ICD-10-CM

## 2023-06-21 DIAGNOSIS — E78.5 HYPERLIPIDEMIA LDL GOAL <130: ICD-10-CM

## 2023-06-21 DIAGNOSIS — R73.01 ELEVATED FASTING GLUCOSE: ICD-10-CM

## 2023-06-21 DIAGNOSIS — Z23 NEED FOR SHINGLES VACCINE: ICD-10-CM

## 2023-06-21 LAB
ALBUMIN SERPL BCG-MCNC: 4.8 G/DL (ref 3.5–5.2)
ALP SERPL-CCNC: 52 U/L (ref 35–104)
ALT SERPL W P-5'-P-CCNC: 48 U/L (ref 0–50)
ANION GAP SERPL CALCULATED.3IONS-SCNC: 13 MMOL/L (ref 7–15)
AST SERPL W P-5'-P-CCNC: 26 U/L (ref 0–45)
BILIRUB SERPL-MCNC: 0.5 MG/DL
BUN SERPL-MCNC: 18.9 MG/DL (ref 8–23)
CALCIUM SERPL-MCNC: 9.9 MG/DL (ref 8.8–10.2)
CHLORIDE SERPL-SCNC: 104 MMOL/L (ref 98–107)
CHOLEST SERPL-MCNC: 178 MG/DL
CREAT SERPL-MCNC: 0.76 MG/DL (ref 0.51–0.95)
DEPRECATED CALCIDIOL+CALCIFEROL SERPL-MC: 45 UG/L (ref 20–75)
DEPRECATED HCO3 PLAS-SCNC: 24 MMOL/L (ref 22–29)
GFR SERPL CREATININE-BSD FRML MDRD: 84 ML/MIN/1.73M2
GLUCOSE SERPL-MCNC: 124 MG/DL (ref 70–99)
HBA1C MFR BLD: 6.2 % (ref 0–5.6)
HDLC SERPL-MCNC: 53 MG/DL
LDLC SERPL CALC-MCNC: 110 MG/DL
NONHDLC SERPL-MCNC: 125 MG/DL
POTASSIUM SERPL-SCNC: 3.9 MMOL/L (ref 3.4–5.3)
PROT SERPL-MCNC: 7.7 G/DL (ref 6.4–8.3)
SODIUM SERPL-SCNC: 141 MMOL/L (ref 136–145)
TRIGL SERPL-MCNC: 75 MG/DL

## 2023-06-21 PROCEDURE — 80053 COMPREHEN METABOLIC PANEL: CPT | Performed by: FAMILY MEDICINE

## 2023-06-21 PROCEDURE — G0439 PPPS, SUBSEQ VISIT: HCPCS | Performed by: FAMILY MEDICINE

## 2023-06-21 PROCEDURE — 83036 HEMOGLOBIN GLYCOSYLATED A1C: CPT | Performed by: FAMILY MEDICINE

## 2023-06-21 PROCEDURE — 99213 OFFICE O/P EST LOW 20 MIN: CPT | Mod: 25 | Performed by: FAMILY MEDICINE

## 2023-06-21 PROCEDURE — 82306 VITAMIN D 25 HYDROXY: CPT | Performed by: FAMILY MEDICINE

## 2023-06-21 PROCEDURE — 36415 COLL VENOUS BLD VENIPUNCTURE: CPT | Performed by: FAMILY MEDICINE

## 2023-06-21 PROCEDURE — 80061 LIPID PANEL: CPT | Performed by: FAMILY MEDICINE

## 2023-06-21 RX ORDER — ROSUVASTATIN CALCIUM 5 MG/1
5 TABLET, COATED ORAL DAILY
Qty: 30 TABLET | Refills: 0 | Status: SHIPPED | OUTPATIENT
Start: 2023-06-21 | End: 2023-06-21

## 2023-06-21 RX ORDER — ROSUVASTATIN CALCIUM 5 MG/1
5 TABLET, COATED ORAL DAILY
Qty: 90 TABLET | Refills: 1 | Status: SHIPPED | OUTPATIENT
Start: 2023-06-21 | End: 2024-02-17

## 2023-06-21 ASSESSMENT — ENCOUNTER SYMPTOMS
HEMATOCHEZIA: 0
EYE PAIN: 0
PALPITATIONS: 0
HEADACHES: 0
WEAKNESS: 0
DYSURIA: 0
ABDOMINAL PAIN: 0
SHORTNESS OF BREATH: 0
CHILLS: 0
ARTHRALGIAS: 0
MYALGIAS: 0
DIARRHEA: 0
NERVOUS/ANXIOUS: 0
NAUSEA: 0
HEARTBURN: 0
CONSTIPATION: 0
DIZZINESS: 0
PARESTHESIAS: 0
JOINT SWELLING: 0
FREQUENCY: 0
COUGH: 1
HEMATURIA: 0
SORE THROAT: 0
BREAST MASS: 0

## 2023-06-21 ASSESSMENT — ACTIVITIES OF DAILY LIVING (ADL): CURRENT_FUNCTION: NO ASSISTANCE NEEDED

## 2023-06-21 ASSESSMENT — PAIN SCALES - GENERAL: PAINLEVEL: MILD PAIN (2)

## 2023-06-21 NOTE — PATIENT INSTRUCTIONS
Patient Education   Personalized Prevention Plan  You are due for the preventive services outlined below.  Your care team is available to assist you in scheduling these services.  If you have already completed any of these items, please share that information with your care team to update in your medical record.  Health Maintenance Due   Topic Date Due    ANNUAL REVIEW OF  ORDERS  Never done    Zoster (Shingles) Vaccine (2 of 3) 07/03/2014    Osteoporosis Screening  02/13/2021    Mammogram  03/25/2022        PLEASE CALL TO SCHEDULE YOUR MAMMOGRAM  [facility] [phone]  [facility] [phone]  [facility]   [phone]  Central Scheduling (all locations) [phone]    If you are under/uninsured, we recommend you contact the [facility]. They offer mammograms on a sliding fee charge. You can schedule with them at [phone].

## 2023-06-21 NOTE — PROGRESS NOTES
"SUBJECTIVE:   Shanae is a 67 year old who presents for Preventive Visit.       No data to display              Are you in the first 12 months of your Medicare coverage?  No    Healthy Habits:     In general, how would you rate your overall health?  Good    Frequency of exercise:  6-7 days/week    Duration of exercise:  15-30 minutes    Do you usually eat at least 4 servings of fruit and vegetables a day, include whole grains    & fiber and avoid regularly eating high fat or \"junk\" foods?  Yes    Taking medications regularly:  Yes    Medication side effects:  None    Ability to successfully perform activities of daily living:  No assistance needed    Home Safety:  No safety concerns identified    Hearing Impairment:  No hearing concerns    In the past 6 months, have you been bothered by leaking of urine?  No    In general, how would you rate your overall mental or emotional health?  Good      PHQ-2 Total Score: 0    Additional concerns today:  No      Wellness Visit Notes:    -Last mammo done 3/2021, due 3/2022 (impression: Negative). Order pended.  -Last DEXA done 2/2018, due 2/2021 (impression: Mild osteopenia in the upper lumbar spine, mildly improved. Moderate osteopenia in the right femoral neck, unchanged). Order pended.  -Last colon cancer screening done Cologuard 3/2021, due 3/2024 (impression: Negative)  -Immunizations: Shingles - Pt will receive at a pharmacy    -ACP: Not on File. Patient has a blank copy she will fill out and bring in.        Have you ever done Advance Care Planning? (For example, a Health Directive, POLST, or a discussion with a medical provider or your loved ones about your wishes): No, advance care planning information given to patient to review.  Advanced care planning was discussed at today's visit.       Fall risk  Fallen 2 or more times in the past year?: No  Any fall with injury in the past year?: No    Cognitive Screening   1) Repeat 3 items (Leader, Season, Table)    2) Clock draw: " NORMAL  3) 3 item recall: Recalls 3 objects  Results: 3 items recalled: COGNITIVE IMPAIRMENT LESS LIKELY    Mini-CogTM Copyright DEONTE Vaca. Licensed by the author for use in [facility]; reprinted with permission (marin@.[facility]). All rights reserved.      Do you have sleep apnea, excessive snoring or daytime drowsiness?: no    Reviewed and updated as needed this visit by clinical staff                  Reviewed and updated as needed this visit by Provider                 Social History     Tobacco Use     Smoking status: Never     Smokeless tobacco: Never   Substance Use Topics     Alcohol use: Yes     Alcohol/week: 0.0 - 1.0 standard drinks of alcohol     Comment: Very Rarely-3 a year             4/29/2022    10:45 AM   Alcohol Use   Prescreen: >3 drinks/day or >7 drinks/week? Not Applicable          No data to display              Do you have a current opioid prescription? No  Do you use any other controlled substances or medications that are not prescribed by a provider? None      PMR is well controlled , no symptoms currently has seen specialist in the past   High cholesterol and she is on the 5 mg of the crestor , no side effects   Needs a DEXA scan and mammogram         Current providers sharing in care for this patient include:   Patient Care Team:  Shona Jasso MD as PCP - General (Family Practice)  Shona Jasso MD as Assigned PCP    The following health maintenance items are reviewed in Epic and correct as of today:  Health Maintenance   Topic Date Due     ANNUAL REVIEW OF  ORDERS  Never done     ZOSTER IMMUNIZATION (2 of 3) 07/03/2014     PHQ-2 (once per calendar year)  01/01/2023     MAMMO SCREENING  03/25/2023     MEDICARE ANNUAL WELLNESS VISIT  04/29/2023     FALL RISK ASSESSMENT  04/29/2023     COLORECTAL CANCER SCREENING  03/23/2024     LIPID  04/29/2027     ADVANCE CARE PLANNING  05/02/2027     DTAP/TDAP/TD IMMUNIZATION (4 - Td or Tdap) 03/10/2031     DEXA  02/13/2033     HEPATITIS C  SCREENING  Completed     INFLUENZA VACCINE  Completed     Pneumococcal Vaccine: 65+ Years  Completed     COVID-19 Vaccine  Completed     IPV IMMUNIZATION  Aged Out     MENINGITIS IMMUNIZATION  Aged Out     Lab work is in process  Labs reviewed in EPIC  BP Readings from Last 3 Encounters:   23 (!) 143/92   22 130/70   03/10/21 124/78    Wt Readings from Last 3 Encounters:   23 55.7 kg (122 lb 11.2 oz)   22 57.2 kg (126 lb)   03/10/21 56.4 kg (124 lb 4.8 oz)                  Patient Active Problem List   Diagnosis     Osteoarthrosis, unspecified whether generalized or localized, pelvic region and thigh     Disorder of bone and cartilage     HYPERLIPIDEMIA LDL GOAL <130     Elevated C-reactive protein (CRP)     PMR (polymyalgia rheumatica) (H)     Osteopenia     Tinea corporis     Lichen sclerosus     Past Surgical History:   Procedure Laterality Date     [facility] NONSPECIFIC PROCEDURE  89    s/p L hip replacement     [facility] NONSPECIFIC PROCEDURE  Age [age]    Rotational osteotomy of Left hip       Social History     Tobacco Use     Smoking status: Never     Smokeless tobacco: Never   Substance Use Topics     Alcohol use: Yes     Alcohol/week: 0.0 - 1.0 standard drinks of alcohol     Comment: Very Rarely-3 a year     Family History   Problem Relation Age of Onset     Heart Disease Mother         CHF     Hypertension Mother      Alcohol/Drug Father      Depression Father      Gastrointestinal Disease Father         Had to have part of stomach removed-many ulcers     C.A.D. Maternal Grandmother         CHF     Hypertension Maternal Grandmother      C.A.D. Maternal Grandfather         Had heart surgery     Heart Disease Maternal Grandfather         MI- first at age 30,  of one in late 50's     Heart Disease Paternal Grandmother         ?     Alcohol/Drug Paternal Grandmother      Hypertension Brother      Alcohol/Drug Sister      Alcohol/Drug Brother      Depression Sister      Gastrointestinal  Disease Sister         Esophageal Reflux, ulcers     Psychotic Disorder Sister      Psychotic Disorder Sister      Depression Brother          Current Outpatient Medications   Medication Sig Dispense Refill     Acetaminophen (TYLENOL PO) Take 500 mg by mouth every 6 hours as needed for mild pain or fever       rosuvastatin (CRESTOR) 5 MG tablet Take 1 tablet (5 mg) by mouth daily 90 tablet 1     Allergies   Allergen Reactions     Azithromycin Diarrhea     Recent Labs   Lab Test 06/21/23  0937 04/29/22  1200 03/10/21  0911 11/05/19  0805 07/13/18  0931 05/01/18  0922   A1C 6.2* 5.7*  --   --   --  5.5   LDL  --  112* 113*  --  111* 185*   HDL  --  62 59  --  58 50   TRIG  --  91 69  --  78 138   ALT  --  64* 46 44 42 48   CR  --  0.74 0.79 0.66 0.73 0.68   GFRESTIMATED  --  88 77 >90 80 87   GFRESTBLACK  --   --  90 >90 >90 >90   POTASSIUM  --  4.4 4.0 4.0 4.5 3.9   TSH  --   --   --  3.16  --   --       Mammogram Screening: Mammogram Screening: Recommended mammography every 1-2 years with patient discussion and risk factor consideration        3/10/2021     8:20 AM   Breast CA Risk Assessment (FHS-7)   Do you have a family history of breast, colon, or ovarian cancer? No / Unknown         Mammogram Screening: Recommended mammography every 1-2 years with patient discussion and risk factor consideration  Pertinent mammograms are reviewed under the imaging tab.    Review of Systems   Constitutional: Negative for chills.   HENT: Positive for congestion. Negative for ear pain, hearing loss and sore throat.    Eyes: Negative for pain and visual disturbance.   Respiratory: Positive for cough. Negative for shortness of breath.    Cardiovascular: Negative for chest pain, palpitations and peripheral edema.   Gastrointestinal: Negative for abdominal pain, constipation, diarrhea, heartburn, hematochezia and nausea.   Breasts:  Negative for tenderness, breast mass and discharge.   Genitourinary: Negative for dysuria, frequency,  "genital sores, hematuria, pelvic pain, urgency, vaginal bleeding and vaginal discharge.   Musculoskeletal: Negative for arthralgias, joint swelling and myalgias.   Skin: Negative for rash.   Neurological: Negative for dizziness, weakness, headaches and paresthesias.   Psychiatric/Behavioral: Negative for mood changes. The patient is not nervous/anxious.      Constitutional, HEENT, cardiovascular, pulmonary, GI, , musculoskeletal, neuro, skin, endocrine and psych systems are negative, except as otherwise noted.    OBJECTIVE:   There were no vitals taken for this visit. Estimated body mass index is 23.42 kg/m  as calculated from the following:    Height as of 4/29/22: 1.562 m (5' 1.5\").    Weight as of 4/29/22: 57.2 kg (126 lb).  Physical Exam  GENERAL APPEARANCE: healthy, alert and no distress  EYES: Eyes grossly normal to inspection, PERRL and conjunctivae and sclerae normal  HENT: ear canals and TM's normal, nose and mouth without ulcers or lesions, oropharynx clear and oral mucous membranes moist  NECK: no adenopathy, no asymmetry, masses, or scars and thyroid normal to palpation  RESP: lungs clear to auscultation - no rales, rhonchi or wheezes  BREAST: normal without masses, tenderness or nipple discharge and no palpable axillary masses or adenopathy  CV: regular rate and rhythm, normal S1 S2, no S3 or S4, no murmur, click or rub, no peripheral edema and peripheral pulses strong  ABDOMEN: soft, nontender, no hepatosplenomegaly, no masses and bowel sounds normal  MS: no musculoskeletal defects are noted and gait is age appropriate without ataxia  SKIN: no suspicious lesions or rashes  NEURO: Normal strength and tone, sensory exam grossly normal, mentation intact and speech normal  PSYCH: mentation appears normal and affect normal/bright    Diagnostic Test Results:  Labs reviewed in Epic  Results for orders placed or performed in visit on 06/21/23 (from the past 24 hour(s))   Hemoglobin A1c   Result Value Ref " Range    Hemoglobin A1C 6.2 (H) 0.0 - 5.6 %       ASSESSMENT / PLAN:   (Z00.00) Encounter for Medicare annual wellness exam  (primary encounter diagnosis)  Comment: Discussed diet,calcium,exercise.Went over self breast exam.Thin prep was NOT  done.Eyes and teeth UTD.No immunizations needed today.See orders below for tests ordered and screening needed.    Plan: PRIMARY CARE FOLLOW-UP SCHEDULING, REVIEW OF         HEALTH MAINTENANCE PROTOCOL ORDERS            (M35.3) PMR (polymyalgia rheumatica) (H)  Comment: is stable   Plan:  No concerns about this currently     (R73.01) Elevated fasting glucose  Comment: will screen   Plan: Hemoglobin A1c            (E78.5) Hyperlipidemia LDL goal <130  Comment: is fasting for the lipids check   Plan: Lipid panel reflex to direct LDL Fasting,         Comprehensive metabolic panel (BMP + Alb, Alk         Phos, ALT, AST, Total. Bili, TP), rosuvastatin         (CRESTOR) 5 MG tablet, DISCONTINUED:         rosuvastatin (CRESTOR) 5 MG tablet        Has no side effects with the crestor     (Z78.0) Asymptomatic menopausal state  Comment: will screen   Plan: DEXA HIP/PELVIS/SPINE - Future        As above     (E55.9) Hypovitaminosis D  Comment: will check vit d levels   Plan: Vitamin D Deficiency        As above     (Z23) Need for shingles vaccine  Comment: will get her shingrix at the pharmacy   Plan: as above     (Z12.31) Visit for screening mammogram  Comment: is due for a mammogram   Plan: MA SCREENING DIGITAL BILAT - Future  (s+30)        Gave her the phone numbers for the breast centers to call and schedule       Patient has been advised of split billing requirements and indicates understanding: Yes      COUNSELING:  Reviewed preventive health counseling, as reflected in patient instructions       Regular exercise       Healthy diet/nutrition       Vision screening       Hearing screening       Dental care       Bladder control       Fall risk prevention        She reports that she has  never smoked. She has never used smokeless tobacco.      Appropriate preventive services were discussed with this patient, including applicable screening as appropriate for cardiovascular disease, diabetes, osteopenia/osteoporosis, and glaucoma.  As appropriate for age/gender, discussed screening for colorectal cancer, prostate cancer, breast cancer, and cervical cancer. Checklist reviewing preventive services available has been given to the patient.    Reviewed patients plan of care and provided an AVS. The Intermediate Care Plan ( asthma action plan, low back pain action plan, and migraine action plan) for Shanae meets the Care Plan requirement. This Care Plan has been established and reviewed with the Patient.      Shona Jasso MD  [facility]    Identified Health Risks:    I have reviewed Opioid Use Disorder and Substance Use Disorder risk factors and made any needed referrals.

## 2023-09-12 ENCOUNTER — HOSPITAL ENCOUNTER (OUTPATIENT)
Dept: BONE DENSITY | Facility: CLINIC | Age: 68
Discharge: HOME | End: 2023-09-12
Attending: FAMILY MEDICINE
Payer: MEDICARE

## 2023-09-12 ENCOUNTER — HOSPITAL ENCOUNTER (OUTPATIENT)
Dept: MAMMOGRAPHY | Facility: CLINIC | Age: 68
Discharge: HOME | End: 2023-09-12
Attending: FAMILY MEDICINE
Payer: MEDICARE

## 2023-09-12 DIAGNOSIS — Z78.0 ASYMPTOMATIC MENOPAUSAL STATE: ICD-10-CM

## 2023-09-12 DIAGNOSIS — Z12.31 VISIT FOR SCREENING MAMMOGRAM: ICD-10-CM

## 2023-09-12 PROCEDURE — 77080 DXA BONE DENSITY AXIAL: CPT

## 2023-09-12 PROCEDURE — 77067 SCR MAMMO BI INCL CAD: CPT

## 2023-09-13 ENCOUNTER — HOSPITAL ENCOUNTER (OUTPATIENT)
Dept: MAMMOGRAPHY | Facility: CLINIC | Age: 68
Discharge: HOME | End: 2023-09-13
Attending: FAMILY MEDICINE
Payer: MEDICARE

## 2023-09-13 DIAGNOSIS — R92.8 ABNORMAL MAMMOGRAM: ICD-10-CM

## 2023-09-13 PROCEDURE — 77061 BREAST TOMOSYNTHESIS UNI: CPT | Mod: RT

## 2023-09-13 PROCEDURE — 76642 ULTRASOUND BREAST LIMITED: CPT | Mod: RT

## 2023-09-19 ENCOUNTER — HOSPITAL ENCOUNTER (OUTPATIENT)
Dept: MAMMOGRAPHY | Facility: CLINIC | Age: 68
Discharge: HOME | End: 2023-09-19
Attending: FAMILY MEDICINE
Payer: MEDICARE

## 2023-09-19 DIAGNOSIS — R92.8 ABNORMAL MAMMOGRAM: ICD-10-CM

## 2023-09-19 PROCEDURE — 19083 BX BREAST 1ST LESION US IMAG: CPT | Mod: RT

## 2023-09-19 PROCEDURE — 250N000009 HC RX 250: Performed by: FAMILY MEDICINE

## 2023-09-19 PROCEDURE — 999N000065 MA POST PROCEDURE RIGHT

## 2023-09-19 PROCEDURE — A4648 IMPLANTABLE TISSUE MARKER: HCPCS

## 2023-09-19 PROCEDURE — 88305 TISSUE EXAM BY PATHOLOGIST: CPT | Mod: TC | Performed by: FAMILY MEDICINE

## 2023-09-19 RX ADMIN — LIDOCAINE HYDROCHLORIDE 5 ML: 10 INJECTION, SOLUTION INFILTRATION; PERINEURAL at 09:43

## 2023-09-19 NOTE — DISCHARGE INSTRUCTIONS

## 2023-09-21 ENCOUNTER — TELEPHONE (OUTPATIENT)
Dept: MAMMOGRAPHY | Facility: CLINIC | Age: 68
End: 2023-09-21

## 2023-09-21 LAB
PATH REPORT.COMMENTS IMP SPEC: NORMAL
PATH REPORT.FINAL DX SPEC: NORMAL
PATH REPORT.GROSS SPEC: NORMAL
PATH REPORT.MICROSCOPIC SPEC OTHER STN: NORMAL
PATH REPORT.MICROSCOPIC SPEC OTHER STN: NORMAL
PATH REPORT.RELEVANT HX SPEC: NORMAL
PHOTO IMAGE: NORMAL

## 2023-09-21 PROCEDURE — 88341 IMHCHEM/IMCYTCHM EA ADD ANTB: CPT | Mod: 26 | Performed by: PATHOLOGY

## 2023-09-21 PROCEDURE — 88305 TISSUE EXAM BY PATHOLOGIST: CPT | Mod: 26 | Performed by: PATHOLOGY

## 2023-09-21 PROCEDURE — 88342 IMHCHEM/IMCYTCHM 1ST ANTB: CPT | Mod: 26 | Performed by: PATHOLOGY

## 2023-09-21 NOTE — TELEPHONE ENCOUNTER
Call placed to Shanae regarding pathology results from RIGHT breast biopsy performed on 9/19/2023 revealing:     [facility]  Shanae Giang [id number]  F, 1955  Surgical Pathology Report (Final result) [id number]  Authorizing Provider: Shona Jasso MD Ordering Provider: Shona Jasso MD  Ordering Location: [facility]  Collected: 09/19/2023 09:41 AM  Pathologist: Blade Moyer MD Received: 09/19/2023 11:22 AM  .  Specimens  A Breast, Right  .  .  Final Diagnosis  Right breast, 1.3 cm mass, 3:00, 3.0 cm from nipple, ltrasound guided 14 gauge needle core biopsies:  - Benign breast tissue with florid usual ductal hyperplasia and focal fibrocystic change with apocrine metaplasia.  - Negative for atypia and malignancy.  Electronically signed by Blade Moyer MD on 9/21/2023 at 10:55 AM     Per Breast Center Radiologist, Dr. Marcellus Guerra, results are concordant with imaging findings.     Recommendation: Annual Screening Mammograms     Shanae reviewed these results via My Chart at 1101 and recommendations were released into My Chart.  Left message for [facility] to call [phone] with questions or concerns.      Angelina Jay RN BSN  Procedure Nurse  [facility]  [phone]

## 2023-12-05 ENCOUNTER — ALLIED HEALTH/NURSE VISIT (OUTPATIENT)
Dept: INTERNAL MEDICINE | Facility: CLINIC | Age: 68
End: 2023-12-05
Payer: MEDICARE

## 2023-12-05 DIAGNOSIS — Z23 NEED FOR PROPHYLACTIC VACCINATION AND INOCULATION AGAINST INFLUENZA: Primary | ICD-10-CM

## 2023-12-05 PROCEDURE — G0008 ADMIN INFLUENZA VIRUS VAC: HCPCS

## 2023-12-05 PROCEDURE — 90662 IIV NO PRSV INCREASED AG IM: CPT

## 2024-02-06 ENCOUNTER — OFFICE VISIT (OUTPATIENT)
Dept: FAMILY MEDICINE | Facility: CLINIC | Age: 69
End: 2024-02-06
Payer: MEDICARE

## 2024-02-06 VITALS
OXYGEN SATURATION: 98 % | HEIGHT: 61 IN | TEMPERATURE: 97.1 F | HEART RATE: 102 BPM | WEIGHT: 125 LBS | SYSTOLIC BLOOD PRESSURE: 148 MMHG | RESPIRATION RATE: 19 BRPM | DIASTOLIC BLOOD PRESSURE: 88 MMHG | BODY MASS INDEX: 23.6 KG/M2

## 2024-02-06 DIAGNOSIS — H25.13 AGE-RELATED NUCLEAR CATARACT OF BOTH EYES: ICD-10-CM

## 2024-02-06 DIAGNOSIS — Z01.818 PREOP GENERAL PHYSICAL EXAM: Primary | ICD-10-CM

## 2024-02-06 DIAGNOSIS — R03.0 ELEVATED BLOOD PRESSURE READING IN OFFICE WITH WHITE COAT SYNDROME, WITHOUT DIAGNOSIS OF HYPERTENSION: ICD-10-CM

## 2024-02-06 DIAGNOSIS — E78.5 HYPERLIPIDEMIA LDL GOAL <130: ICD-10-CM

## 2024-02-06 DIAGNOSIS — Z87.39 HISTORY OF ARTHRITIS: ICD-10-CM

## 2024-02-06 PROCEDURE — 99214 OFFICE O/P EST MOD 30 MIN: CPT | Performed by: FAMILY MEDICINE

## 2024-02-06 ASSESSMENT — PAIN SCALES - GENERAL: PAINLEVEL: NO PAIN (0)

## 2024-02-06 NOTE — PROGRESS NOTES
Preoperative Evaluation  [facility]  [address], [address]  [facility] [address]  Phone: [phone]  Fax [phone]  Primary Provider: Shona Jasso  Pre-op Performing Provider: STEVE ROBERTSON  Feb 6, 2024       Shanae is a 68 year old, presenting for the following:  Pre-Op Exam        2/6/2024     1:40 PM   Additional Questions   Roomed by abdulaziz rios   Accompanied by carl gutierres         2/6/2024     1:40 PM   Patient Reported Additional Medications   Patient reports taking the following new medications n/a     Surgical Information  Surgery/Procedure: cataract ( right eye first)  Surgery Location: [facility] surgery  Surgeon: sarah daniels m.d  Surgery Date: 02/12/24  Time of Surgery: 11:20  Where patient plans to recover: At home with family  Fax number for surgical facility: [phone]    Assessment & Plan     The proposed surgical procedure is considered LOW risk.  Age-related nuclear cataract of both eyes  Preop general physical exam  1. Elevated blood pressure reading in office with white coat syndrome, without diagnosis of hypertension  Plan: recheck Blood pressure is improved  She is good at monitoring her bp at home and its always in range of < 130/80    2. Hyperlipidemia LDL goal <130  Plan: she is taking/tolerating rosuvastatin well     3. History of arthritis  Plan: no concerns with previous history of PMR          - No identified additional risk factors other than previously addressed        Recommendation  APPROVAL GIVEN to proceed with proposed procedure, without further diagnostic evaluation.      No LOS data to display   Time spent by me doing chart review, history and exam, documentation and further activities per the note    Subjective       HPI related to upcoming procedure: Bilateral cataract affecting night driving. She is getting Rt cataract extraction next week and the left in 3 weeks  She is looking forward to the procedure. She reports she is otherwise in  usual state of good health. Has a home Blood pressure  monitor and reports BP is always in good range never above > 130/80, certainly never what we find in the clinic         2/6/2024     9:52 AM   Preop Questions   1. Have you ever had a heart attack or stroke? No   2. Have you ever had surgery on your heart or blood vessels, such as a stent placement, a coronary artery bypass, or surgery on an artery in your head, neck, heart, or legs? No   3. Do you have chest pain with activity? No   4. Do you have a history of  heart failure? No   5. Do you currently have a cold, bronchitis or symptoms of other infection? No   6. Do you have a cough, shortness of breath, or wheezing? No   7. Do you or anyone in your family have previous history of blood clots? No   8. Do you or does anyone in your family have a serious bleeding problem such as prolonged bleeding following surgeries or cuts? No   9. Have you ever had problems with anemia or been told to take iron pills? No   10. Have you had any abnormal blood loss such as black, tarry or bloody stools, or abnormal vaginal bleeding? No   11. Have you ever had a blood transfusion? No   12. Are you willing to have a blood transfusion if it is medically needed before, during, or after your surgery? Yes   13. Have you or any of your relatives ever had problems with anesthesia? No   14. Do you have sleep apnea, excessive snoring or daytime drowsiness? No   15. Do you have any artifical heart valves or other implanted medical devices like a pacemaker, defibrillator, or continuous glucose monitor? No   16. Do you have artificial joints? YES - left hip replacement   17. Are you allergic to latex? No       Health Care Directive  Patient does not have a Health Care Directive or Living Will: Discussed advance care planning with patient; information given to patient to review.    Preoperative Review of    reviewed - no record of controlled substances prescribed.      Status of Chronic  Conditions:  HYPERLIPIDEMIA - Patient has a long history of significant Hyperlipidemia requiring medication for treatment with recent good control. Patient reports no problems or side effects with the medication.     Patient Active Problem List    Diagnosis Date Noted    Lichen sclerosus 07/13/2018     Priority: Medium    Tinea corporis 04/03/2018     Priority: Medium    Osteopenia 01/12/2016     Priority: Medium    PMR (polymyalgia rheumatica) (H24) 03/26/2015     Priority: Medium    Elevated C-reactive protein (CRP) 02/23/2015     Priority: Medium    HYPERLIPIDEMIA LDL GOAL <130 10/31/2010     Priority: Medium    Osteoarthrosis, unspecified whether generalized or localized, pelvic region and thigh 09/21/2004     Priority: Medium     Mult joints      Disorder of bone and cartilage 09/21/2004     Priority: Medium     Problem list name updated by automated process. Provider to review        Past Medical History:   Diagnosis Date    Disorder of bone and cartilage, unspecified     Osteopenia- dietary calcium, mvi    Mild intermittent asthma     sx's only with uri's, then uses albut neb    Osteoarthrosis, unspecified whether generalized or localized, other specified sites     Right hip arthritis    Osteoarthrosis, unspecified whether generalized or localized, pelvic region and thigh     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     Past Surgical History:   Procedure Laterality Date    [facility] NONSPECIFIC PROCEDURE  09-09-98    s/p L hip replacement    [facility] NONSPECIFIC PROCEDURE  Age 6/7    Rotational osteotomy of Left hip     Current Outpatient Medications   Medication Sig Dispense Refill    Acetaminophen (TYLENOL PO) Take 500 mg by mouth every 6 hours as needed for mild pain or fever      rosuvastatin (CRESTOR) 5 MG tablet Take 1 tablet (5 mg) by mouth daily 90 tablet 1       Allergies   Allergen Reactions    Azithromycin Diarrhea        Social History     Tobacco Use    Smoking status: Never    Smokeless tobacco: Never   Substance Use  "Topics    Alcohol use: Yes     Alcohol/week: 0.0 - 1.0 standard drinks of alcohol     Comment: Very Rarely-3 a year     Family History   Problem Relation Age of Onset    Heart Disease Mother         CHF    Hypertension Mother     Alcohol/Drug Father     Depression Father     Gastrointestinal Disease Father         Had to have part of stomach removed-many ulcers    C.A.D. Maternal Grandmother         CHF    Hypertension Maternal Grandmother     C.A.D. Maternal Grandfather         Had heart surgery    Heart Disease Maternal Grandfather         MI- first at age 30,  of one in late 50's    Heart Disease Paternal Grandmother         ?    Alcohol/Drug Paternal Grandmother     Hypertension Brother     Alcohol/Drug Sister     Alcohol/Drug Brother     Depression Sister     Gastrointestinal Disease Sister         Esophageal Reflux, ulcers    Psychotic Disorder Sister     Psychotic Disorder Sister     Depression Brother      History   Drug Use No         Review of Systems    Review of Systems  Constitutional, HEENT, cardiovascular, pulmonary, GI, , musculoskeletal, neuro, skin, endocrine and psych systems are negative, except as otherwise noted.  Objective    LMP  (LMP Unknown)    Estimated body mass index is 22.62 kg/m  as calculated from the following:    Height as of 23: 1.568 m (5' 1.75\").    Weight as of 23: 55.7 kg (122 lb 11.2 oz).  Physical Exam  GENERAL: alert and no distress  HENT: ear canals and TM's normal, nose and mouth without ulcers or lesions  NECK: no adenopathy, no asymmetry, masses, or scars  RESP: lungs clear to auscultation - no rales, rhonchi or wheezes  CV: regular rate and rhythm, normal S1 S2, no S3 or S4, no murmur, click or rub, no peripheral edema  ABDOMEN: soft, nontender, no hepatosplenomegaly, no masses and bowel sounds normal  MS: no gross musculoskeletal defects noted, no edema  PSYCH: mentation appears normal, affect normal/bright    Recent Labs   Lab Test 23  0937 " 04/29/22  1200    141   POTASSIUM 3.9 4.4   CR 0.76 0.74   A1C 6.2* 5.7*        Diagnostics  No labs were ordered during this visit.   No EKG required for low risk surgery (cataract, skin procedure, breast biopsy, etc).    Revised Cardiac Risk Index (RCRI)  The patient has the following serious cardiovascular risks for perioperative complications:   - No serious cardiac risks = 0 points     RCRI Interpretation: 0 points: Class I (very low risk - 0.4% complication rate)         Signed Electronically by: Sia Melendez MD  Copy of this evaluation report is provided to requesting physician.

## 2024-02-16 DIAGNOSIS — E78.5 HYPERLIPIDEMIA LDL GOAL <130: ICD-10-CM

## 2024-02-17 ENCOUNTER — MYC REFILL (OUTPATIENT)
Dept: FAMILY MEDICINE | Facility: CLINIC | Age: 69
End: 2024-02-17
Payer: MEDICARE

## 2024-02-17 DIAGNOSIS — E78.5 HYPERLIPIDEMIA LDL GOAL <130: ICD-10-CM

## 2024-02-19 RX ORDER — ROSUVASTATIN CALCIUM 5 MG/1
5 TABLET, COATED ORAL DAILY
Qty: 90 TABLET | Refills: 1 | OUTPATIENT
Start: 2024-02-19

## 2024-02-19 RX ORDER — ROSUVASTATIN CALCIUM 5 MG/1
5 TABLET, COATED ORAL DAILY
Qty: 90 TABLET | Refills: 0 | Status: SHIPPED | OUTPATIENT
Start: 2024-02-19 | End: 2024-06-28

## 2024-03-29 DIAGNOSIS — Z12.11 COLON CANCER SCREENING: ICD-10-CM

## 2024-04-12 ENCOUNTER — ORDERS ONLY (AUTO-RELEASED) (OUTPATIENT)
Dept: FAMILY MEDICINE | Facility: CLINIC | Age: 69
End: 2024-04-12
Payer: MEDICARE

## 2024-04-12 DIAGNOSIS — Z12.11 COLON CANCER SCREENING: ICD-10-CM

## 2024-04-28 LAB — NONINV COLON CA DNA+OCC BLD SCRN STL QL: NEGATIVE

## 2024-06-26 SDOH — HEALTH STABILITY: PHYSICAL HEALTH: ON AVERAGE, HOW MANY MINUTES DO YOU ENGAGE IN EXERCISE AT THIS LEVEL?: 30 MIN

## 2024-06-26 SDOH — HEALTH STABILITY: PHYSICAL HEALTH: ON AVERAGE, HOW MANY DAYS PER WEEK DO YOU ENGAGE IN MODERATE TO STRENUOUS EXERCISE (LIKE A BRISK WALK)?: 3 DAYS

## 2024-06-26 ASSESSMENT — SOCIAL DETERMINANTS OF HEALTH (SDOH): HOW OFTEN DO YOU GET TOGETHER WITH FRIENDS OR RELATIVES?: THREE TIMES A WEEK

## 2024-06-27 NOTE — COMMUNITY RESOURCES LIST (ENGLISH)
June 27, 2024           YOUR PERSONALIZED LIST OF SERVICES & PROGRAMS               Bill Payment Assistance      [facility] - Low Income Energy Assistance Program (LIHEAP) application assistance  [address] [place] [address] (Distance: 3.3 miles)  Phone: [phone]) [phone]  Language: English  Fee: Free      [facility] ([facility] - Energy Assistance Program (EAP)  [address] [place] [address] (Distance: 2.9 miles)  Phone: (344) 399-9702  Email: eap@[email or web address]  Website: https://[facility]TE[facility]ZZ/what-we-do/energy-assistance.html  Language: English  Accessibility: Ada accessible, Blind accommodation      - Dislocated Worker/Adult [employer] Employment Program  Phone: (464) 508-2699  Email: jolene@[employer]  Website: https://[employer]/services/employment-services/dislocated-worker-program/  Language: English, Russian  Hours: Mon 8:00 AM - 4:30 PM Tue 8:00 AM - 4:30 PM Wed 8:00 AM - 4:30 PM Thu 8:00 AM - 4:30 PM Fri 8:00 AM - 4:30 PM  Fee: Free  Accessibility: Ada accessible               IMPORTANT NUMBERS & WEBSITES        Emergency Services  911  .   [facility]  211 http://211unitedway.org  .   Poison Control  (291) 294-5769 http://[place]poison.org http://wis[place]poison.org  .     Suicide and Crisis Lifeline  988 http://988lifeline.org  .   Childhelp National Child Abuse Hotline  886.526.9470 http://Childhelphotline.org   .   National Sexual Assault Hotline  (564) 436-3521 (HOPE) http://Rainn.org   .     National Runaway Safeline  (786) 580-7169 (RUNAWAY) http://1800runaway.org  .   Pregnancy & Postpartum Support  Call/text 140-545-3851  [place]: http://ppsupportmn.org  [place]: http://Ascendant Dx.com/wi  .   Substance Abuse National Helpline ([facility])  912-272-HELP (6791) http://Findtreatment.gov   .                DISCLAIMER: These resources have been generated via the [employer] Platform. [employer] does not endorse any service providers mentioned in this  resource list. [facility] does not guarantee that the services mentioned in this resource list will be available to you or will improve your health or wellness.    [facility]

## 2024-06-28 ENCOUNTER — OFFICE VISIT (OUTPATIENT)
Dept: FAMILY MEDICINE | Facility: CLINIC | Age: 69
End: 2024-06-28
Attending: FAMILY MEDICINE
Payer: MEDICARE

## 2024-06-28 VITALS
DIASTOLIC BLOOD PRESSURE: 84 MMHG | WEIGHT: 128.1 LBS | OXYGEN SATURATION: 97 % | RESPIRATION RATE: 16 BRPM | SYSTOLIC BLOOD PRESSURE: 147 MMHG | HEART RATE: 71 BPM | HEIGHT: 63 IN | TEMPERATURE: 98.1 F | BODY MASS INDEX: 22.7 KG/M2

## 2024-06-28 DIAGNOSIS — R73.01 ELEVATED FASTING GLUCOSE: ICD-10-CM

## 2024-06-28 DIAGNOSIS — E55.9 HYPOVITAMINOSIS D: ICD-10-CM

## 2024-06-28 DIAGNOSIS — M35.3 PMR (POLYMYALGIA RHEUMATICA) (H): ICD-10-CM

## 2024-06-28 DIAGNOSIS — E78.5 HYPERLIPIDEMIA LDL GOAL <130: ICD-10-CM

## 2024-06-28 DIAGNOSIS — Z00.00 ENCOUNTER FOR MEDICARE ANNUAL WELLNESS EXAM: Primary | ICD-10-CM

## 2024-06-28 LAB
ALBUMIN SERPL BCG-MCNC: 4.7 G/DL (ref 3.5–5.2)
ALP SERPL-CCNC: 63 U/L (ref 40–150)
ALT SERPL W P-5'-P-CCNC: 40 U/L (ref 0–50)
ANION GAP SERPL CALCULATED.3IONS-SCNC: 10 MMOL/L (ref 7–15)
AST SERPL W P-5'-P-CCNC: 30 U/L (ref 0–45)
BILIRUB SERPL-MCNC: 0.4 MG/DL
BUN SERPL-MCNC: 16.7 MG/DL (ref 8–23)
CALCIUM SERPL-MCNC: 9.9 MG/DL (ref 8.8–10.2)
CHLORIDE SERPL-SCNC: 106 MMOL/L (ref 98–107)
CHOLEST SERPL-MCNC: 188 MG/DL
CREAT SERPL-MCNC: 0.7 MG/DL (ref 0.51–0.95)
DEPRECATED HCO3 PLAS-SCNC: 26 MMOL/L (ref 22–29)
EGFRCR SERPLBLD CKD-EPI 2021: >90 ML/MIN/1.73M2
FASTING STATUS PATIENT QL REPORTED: YES
FASTING STATUS PATIENT QL REPORTED: YES
GLUCOSE SERPL-MCNC: 111 MG/DL (ref 70–99)
HBA1C MFR BLD: 5.8 % (ref 0–5.6)
HDLC SERPL-MCNC: 60 MG/DL
LDLC SERPL CALC-MCNC: 111 MG/DL
NONHDLC SERPL-MCNC: 128 MG/DL
POTASSIUM SERPL-SCNC: 4.1 MMOL/L (ref 3.4–5.3)
PROT SERPL-MCNC: 7.8 G/DL (ref 6.4–8.3)
SODIUM SERPL-SCNC: 142 MMOL/L (ref 135–145)
TRIGL SERPL-MCNC: 87 MG/DL
VIT D+METAB SERPL-MCNC: 45 NG/ML (ref 20–50)

## 2024-06-28 PROCEDURE — 82306 VITAMIN D 25 HYDROXY: CPT | Performed by: FAMILY MEDICINE

## 2024-06-28 PROCEDURE — 83036 HEMOGLOBIN GLYCOSYLATED A1C: CPT | Performed by: FAMILY MEDICINE

## 2024-06-28 PROCEDURE — 99213 OFFICE O/P EST LOW 20 MIN: CPT | Mod: 25 | Performed by: FAMILY MEDICINE

## 2024-06-28 PROCEDURE — G0439 PPPS, SUBSEQ VISIT: HCPCS | Performed by: FAMILY MEDICINE

## 2024-06-28 PROCEDURE — 80061 LIPID PANEL: CPT | Performed by: FAMILY MEDICINE

## 2024-06-28 PROCEDURE — 80053 COMPREHEN METABOLIC PANEL: CPT | Performed by: FAMILY MEDICINE

## 2024-06-28 PROCEDURE — 36415 COLL VENOUS BLD VENIPUNCTURE: CPT | Performed by: FAMILY MEDICINE

## 2024-06-28 RX ORDER — ROSUVASTATIN CALCIUM 5 MG/1
5 TABLET, COATED ORAL DAILY
Qty: 90 TABLET | Refills: 4 | Status: SHIPPED | OUTPATIENT
Start: 2024-06-28

## 2024-06-28 RX ORDER — RESPIRATORY SYNCYTIAL VIRUS VACCINE 120MCG/0.5
0.5 KIT INTRAMUSCULAR ONCE
Qty: 1 EACH | Refills: 0 | Status: CANCELLED | OUTPATIENT
Start: 2024-06-28 | End: 2024-06-28

## 2024-06-28 ASSESSMENT — PAIN SCALES - GENERAL: PAINLEVEL: NO PAIN (0)

## 2024-06-28 NOTE — PROGRESS NOTES
Preventive Care Visit  [facility]  Shona Jasso MD, Family Medicine  Jun 28, 2024      Assessment & Plan     Encounter for Medicare annual wellness exam  Is doing well   - PRIMARY CARE FOLLOW-UP SCHEDULING    Hyperlipidemia LDL goal <130  Fasting for lipids and labs check as below   - Comprehensive metabolic panel (BMP + Alb, Alk Phos, ALT, AST, Total. Bili, TP); Future  - Lipid panel reflex to direct LDL Fasting; Future  - rosuvastatin (CRESTOR) 5 MG tablet; Take 1 tablet (5 mg) by mouth daily  - Comprehensive metabolic panel (BMP + Alb, Alk Phos, ALT, AST, Total. Bili, TP)  - Lipid panel reflex to direct LDL Fasting    PMR (polymyalgia rheumatica) (H24)  Stable and no recent issues with this     Elevated fasting glucose  Had a Hbg A1 c at 6.2 last year and we will recheck today   - Hemoglobin A1c; Future  - Hemoglobin A1c    Hypovitaminosis D  Will screen   - Vitamin D Deficiency; Future  - Vitamin D Deficiency    Patient has been advised of split billing requirements and indicates understanding: Yes        Counseling  Appropriate preventive services were discussed with this patient, including applicable screening as appropriate for fall prevention, nutrition, physical activity, Tobacco-use cessation, weight loss and cognition.  Checklist reviewing preventive services available has been given to the patient.  Reviewed patient's diet, addressing concerns and/or questions.   She is at risk for lack of exercise and has been provided with information to increase physical activity for the benefit of her well-being.   She is at risk for psychosocial distress and has been provided with information to reduce risk.           Maria R Jolly is a 68 year old, presenting for the following:  No chief complaint on file.        6/28/2024     8:33 AM   Additional Questions   Roomed by SANDY Agarwal   Accompanied by N/A         6/28/2024     8:33 AM   Patient Reported Additional Medications   Patient reports  "taking the following new medications OTC tumeric         Health Care Directive  Patient does not have a Health Care Directive or Living Will: Patient states has Advance Directive and will bring in a copy to clinic.    HPI    Wellness Visit Notes:  -Last mammo done 9/13/2023, due 9/2024 (impression: BI-RADS CATEGORY: 4 - Suspicious Abnormality) per chart review. Per chart review, Pt completed US breast biopsy on 9/19/2023 and results indicated \"benign breast tissue\" and \"negative for atypia and malignancy\".  -Last DEXA done 9/2023, due 9/2026 (impression: osteopenia) per chart review.   -Last colon cancer screening done 4/2024 via Cologuard, due 4/2027 (impression: negative) per chart review.  -Immunizations: RSV/Shingles - Pt verbalized will receive at pharmacy, COVID vaccine - Pt reports receiving Moderna COVID vaccine at [facility] in 10/7/2023. Pt declines COVID vaccine booster for 5655-6719 season and reports she will receive next COVID vaccine in fall 2024.   -Dermatology: Does Pt meet with dermatology regularly? Pt denies meeting with a dermatologist regularly.           6/26/2024   General Health   How would you rate your overall physical health? Good   Feel stress (tense, anxious, or unable to sleep) Only a little      (!) STRESS CONCERN      6/26/2024   Nutrition   Diet: Regular (no restrictions)            6/26/2024   Exercise   Days per week of moderate/strenous exercise 3 days   Average minutes spent exercising at this level 30 min            6/26/2024   Social Factors   Frequency of gathering with friends or relatives Three times a week   Worry food won't last until get money to buy more No   Food not last or not have enough money for food? No   Do you have housing? (Housing is defined as stable permanent housing and does not include staying ouside in a car, in a tent, in an abandoned building, in an overnight shelter, or couch-surfing.) Yes   Are you worried about losing your housing? No   Lack of " transportation? No   Unable to get utilities (heat,electricity)? Yes   Want help with housing or utility concern? No      (!) FINANCIAL RESOURCE STRAIN CONCERN      6/26/2024   Fall Risk   Fallen 2 or more times in the past year? No    No   Trouble with walking or balance? No    No       Multiple values from one day are sorted in reverse-chronological order          6/26/2024   Activities of Daily Living- Home Safety   Needs help with the following daily activites None of the above   Safety concerns in the home None of the above            6/26/2024   Dental   Dentist two times every year? Yes            6/26/2024   Hearing Screening   Hearing concerns? None of the above            6/26/2024   Driving Risk Screening   Patient/family members have concerns about driving No            6/26/2024   General Alertness/Fatigue Screening   Have you been more tired than usual lately? No            6/26/2024   Urinary Incontinence Screening   Bothered by leaking urine in past 6 months No            6/26/2024   TB Screening   Were you born outside of the [place]? No              Today's PHQ-2 Score:       6/28/2024     8:27 AM   PHQ-2 ( 1999 Pfizer)   Q1: Little interest or pleasure in doing things 0   Q2: Feeling down, depressed or hopeless 0   PHQ-2 Score 0   Q1: Little interest or pleasure in doing things Not at all   Q2: Feeling down, depressed or hopeless Not at all   PHQ-2 Score 0         6/26/2024   Substance Use   Alcohol more than 3/day or more than 7/wk No   Do you have a current opioid prescription? No   How severe/bad is pain from 1 to 10? 2/10   Do you use any other substances recreationally? No        Social History     Tobacco Use    Smoking status: Never    Smokeless tobacco: Never   Vaping Use    Vaping status: Never Used   Substance Use Topics    Alcohol use: Yes     Alcohol/week: 0.0 - 1.0 standard drinks of alcohol     Comment: Very Rarely-3 a year    Drug use: No           9/12/2023   LAST FHS-7 RESULTS   1st  degree relative breast or ovarian cancer No   Any relative bilateral breast cancer No   Any male have breast cancer No   Any ONE woman have BOTH breast AND ovarian cancer No   Any woman with breast cancer before 50yrs No   2 or more relatives with breast AND/OR ovarian cancer No   2 or more relatives with breast AND/OR bowel cancer No           Mammogram Screening - Mammogram every 1-2 years updated in Health Maintenance based on mutual decision making      History of abnormal Pap smear: No - age 65 or older with adequate negative prior screening test results (3 consecutive negative cytology results, 2 consecutive negative cotesting results, or 2 consecutive negative HrHPV test results within 10 years, with the most recent test occurring within the recommended screening interval for the test used)        5/8/2014    12:00 AM 4/22/2011    12:00 AM 2/3/2009    12:00 AM   PAP / HPV   PAP (Historical) NIL  NIL  NIL      ASCVD Risk   The 10-year ASCVD risk score (Carolyne NAPOLES, et al., 2019) is: 12.1%    Values used to calculate the score:      Age: 68 years      Sex: Female      Is Non- : No      Diabetic: No      Tobacco smoker: No      Systolic Blood Pressure: 148 mmHg      Is BP treated: No      HDL Cholesterol: 53 mg/dL      Total Cholesterol: 178 mg/dL            Reviewed and updated as needed this visit by Provider                    Past Medical History:   Diagnosis Date    Disorder of bone and cartilage, unspecified     Osteopenia- dietary calcium, mvi    Mild intermittent asthma     sx's only with uri's, then uses albut neb    Osteoarthrosis, unspecified whether generalized or localized, other specified sites     Right hip arthritis    Osteoarthrosis, unspecified whether generalized or localized, pelvic region and thigh     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     Past Surgical History:   Procedure Laterality Date    BIOPSY  1980'and fall of 2023    breast biopsy    ORTHOPEDIC SURGERY   ,     Left hip replaced, revised    [facility] NONSPECIFIC PROCEDURE  1998    s/p L hip replacement    [facility] NONSPECIFIC PROCEDURE  Age 6/7    Rotational osteotomy of Left hip     Lab work is in process  Labs reviewed in EPIC  BP Readings from Last 3 Encounters:   24 (!) 147/84   24 (!) 148/88   23 130/80    Wt Readings from Last 3 Encounters:   24 58.1 kg (128 lb 1.6 oz)   24 56.7 kg (125 lb)   23 55.7 kg (122 lb 11.2 oz)                  Patient Active Problem List   Diagnosis    Osteoarthrosis, unspecified whether generalized or localized, pelvic region and thigh    Disorder of bone and cartilage    HYPERLIPIDEMIA LDL GOAL <130    Elevated C-reactive protein (CRP)    Osteopenia    Tinea corporis    Lichen sclerosus    Elevated blood pressure reading in office with white coat syndrome, without diagnosis of hypertension    Age-related nuclear cataract of both eyes     Past Surgical History:   Procedure Laterality Date    BIOPSY  and     breast biopsy    ORTHOPEDIC SURGERY  ,     Left hip replaced, revised    [facility] NONSPECIFIC PROCEDURE  1998    s/p L hip replacement    [facility] NONSPECIFIC PROCEDURE  Age 6/7    Rotational osteotomy of Left hip       Social History     Tobacco Use    Smoking status: Never    Smokeless tobacco: Never   Substance Use Topics    Alcohol use: Yes     Alcohol/week: 0.0 - 1.0 standard drinks of alcohol     Comment: Very Rarely-3 a year     Family History   Problem Relation Age of Onset    Heart Disease Mother         CHF    Hypertension Mother     Alcohol/Drug Father     Depression Father     Gastrointestinal Disease Father         Had to have part of stomach removed-many ulcers    C.A.D. Maternal Grandmother         CHF    Hypertension Maternal Grandmother     C.A.D. Maternal Grandfather         Had heart surgery    Heart Disease Maternal Grandfather         MI- first at age 30,  of one in late 50's    Heart Disease  Paternal Grandmother         ?    Alcohol/Drug Paternal Grandmother     Hypertension Brother     Alcohol/Drug Sister     Alcohol/Drug Brother     Depression Sister     Gastrointestinal Disease Sister         Esophageal Reflux, ulcers    Psychotic Disorder Sister     Psychotic Disorder Sister     Depression Brother          Current Outpatient Medications   Medication Sig Dispense Refill    Acetaminophen (TYLENOL PO) Take 500 mg by mouth every 6 hours as needed for mild pain or fever      rosuvastatin (CRESTOR) 5 MG tablet Take 1 tablet (5 mg) by mouth daily 90 tablet 4    UNABLE TO FIND MEDICATION NAME: tumeric 2 capsules daily       Allergies   Allergen Reactions    Azithromycin Diarrhea     Recent Labs   Lab Test 06/21/23  0937 04/29/22  1200 03/10/21  0911 11/05/19  0805 07/13/18  0931 05/01/18  0922   A1C 6.2* 5.7*  --   --   --  5.5   * 112* 113*  --    < > 185*   HDL 53 62 59  --    < > 50   TRIG 75 91 69  --    < > 138   ALT 48 64* 46 44   < > 48   CR 0.76 0.74 0.79 0.66   < > 0.68   GFRESTIMATED 84 88 77 >90   < > 87   GFRESTBLACK  --   --  90 >90   < > >90   POTASSIUM 3.9 4.4 4.0 4.0   < > 3.9   TSH  --   --   --  3.16  --   --     < > = values in this interval not displayed.      Current providers sharing in care for this patient include:  Patient Care Team:  Shona Jasso MD as PCP - General (Family Practice)  Shona Jasso MD as Assigned PCP    The following health maintenance items are reviewed in Epic and correct as of today:  Health Maintenance   Topic Date Due    RSV VACCINE (Pregnancy & 60+) (1 - 1-dose 60+ series) Never done    ZOSTER IMMUNIZATION (2 of 3) 07/03/2014    COVID-19 Vaccine (7 - 2023-24 season) 09/01/2023    LIPID  06/21/2024    ANNUAL REVIEW OF HM ORDERS  06/21/2024    MEDICARE ANNUAL WELLNESS VISIT  06/21/2024    MAMMO SCREENING  09/13/2024    FALL RISK ASSESSMENT  06/28/2025    GLUCOSE  06/21/2026    DEXA  09/12/2026    COLORECTAL CANCER SCREENING  04/22/2027    ADVANCE CARE  "PLANNING  02/06/2029    DTAP/TDAP/TD IMMUNIZATION (4 - Td or Tdap) 03/10/2031    HEPATITIS C SCREENING  Completed    PHQ-2 (once per calendar year)  Completed    INFLUENZA VACCINE  Completed    Pneumococcal Vaccine: 65+ Years  Completed    IPV IMMUNIZATION  Aged Out    HPV IMMUNIZATION  Aged Out    MENINGITIS IMMUNIZATION  Aged Out    RSV MONOCLONAL ANTIBODY  Aged Out         Review of Systems  Constitutional, HEENT, cardiovascular, pulmonary, GI, , musculoskeletal, neuro, skin, endocrine and psych systems are negative, except as otherwise noted.     Objective    Exam  LMP  (LMP Unknown)    Estimated body mass index is 23.62 kg/m  as calculated from the following:    Height as of 2/6/24: 1.549 m (5' 1\").    Weight as of 2/6/24: 56.7 kg (125 lb).    Physical Exam  GENERAL: alert and no distress  EYES: Eyes grossly normal to inspection, PERRL and conjunctivae and sclerae normal  HENT: ear canals and TM's normal, nose and mouth without ulcers or lesions  NECK: no adenopathy, no asymmetry, masses, or scars  RESP: lungs clear to auscultation - no rales, rhonchi or wheezes  CV: regular rate and rhythm, normal S1 S2, no S3 or S4, no murmur, click or rub, no peripheral edema  ABDOMEN: soft, nontender, no hepatosplenomegaly, no masses and bowel sounds normal  MS: no gross musculoskeletal defects noted, no edema  SKIN: no suspicious lesions or rashes  NEURO: Normal strength and tone, mentation intact and speech normal  PSYCH: mentation appears normal, affect normal/bright         No data to display                       Signed Electronically by: Shona Jasso MD    "

## 2024-08-14 ENCOUNTER — PATIENT OUTREACH (OUTPATIENT)
Dept: CARE COORDINATION | Facility: CLINIC | Age: 69
End: 2024-08-14
Payer: MEDICARE

## 2024-09-11 ENCOUNTER — PATIENT OUTREACH (OUTPATIENT)
Dept: CARE COORDINATION | Facility: CLINIC | Age: 69
End: 2024-09-11
Payer: MEDICARE

## 2024-11-11 ENCOUNTER — TELEPHONE (OUTPATIENT)
Dept: FAMILY MEDICINE | Facility: CLINIC | Age: 69
End: 2024-11-11
Payer: MEDICARE

## 2024-11-11 DIAGNOSIS — U07.1 INFECTION DUE TO 2019 NOVEL CORONAVIRUS: Primary | ICD-10-CM

## 2024-11-11 NOTE — TELEPHONE ENCOUNTER
COVID Positive/Requesting COVID treatment    Patient is positive for COVID and requesting treatment options.    Date of positive COVID test (PCR or at home)? 11/10/24 At Home  Current COVID symptoms: fever or chills, cough, fatigue, muscle or body aches, headache, sore throat, congestion or runny nose, nausea or vomiting, and diarrhea  Date COVID symptoms began: 11/9/24    Message should be routed to clinic RN pool. Best phone number to use for call back: [phone]

## 2024-11-11 NOTE — TELEPHONE ENCOUNTER
RN COVID TREATMENT VISIT  11/11/24      The patient has been triaged and does not require a higher level of care.    Shanae Giang  68 year old  Current weight? 125    Has the patient been seen by a primary care provider at an [facility] or [facility] within the past two years? Yes.   Have you been in close proximity to/do you have a known exposure to a person with a confirmed case of influenza? No.     General treatment eligibility:  Date of positive COVID test (PCR or at home)?  See other note for completed protocol

## 2024-11-11 NOTE — TELEPHONE ENCOUNTER
RN COVID TREATMENT VISIT  11/11/24  2    The patient has been triaged and does not require a higher level of care.    Shanae Giang  68 year old  Current weight? 125    Has the patient been seen by a primary care provider at an [facility] or [email or web address] within the past two years? Yes.   Have you been in close proximity to/do you have a known exposure to a person with a confirmed case of influenza? No.     General treatment eligibility:  Date of positive COVID test (PCR or at home)?  11/10/24    Are you or have you been hospitalized for this COVID-19 infection? No.   Have you received monoclonal antibodies or antiviral treatment for COVID-19 since this positive test? No.   Do you have any of the following conditions that place you at risk of being very sick from COVID-19?   - Age 50 years or older  - Heart conditions such as cardiomyopathies, congenital heart defects, coronary artery disease, heart arrhythmias, heart failure, hypertension, valve disorders   Yes, patient has at least one high risk condition as noted above.     Current COVID symptoms:   - fever or chills  - cough  - shortness of breath or difficulty breathing  - fatigue  - muscle or body aches  - headache  - sore throat  - congestion or runny nose  - nausea or vomiting  Yes. Patient has at least one symptom as selected.     How many days since symptoms started? 5 days or less. Established patient, 12 years or older weighing at least 88.2 lbs, who has symptoms that started in the past 5 days, has not been hospitalized nor received treatment already, and is at risk for being very sick from COVID-19.     Treatment eligibility by RN:  Are you currently pregnant or nursing? No  Do you have a clinically significant hypersensitivity to nirmatrelvir or ritonavir, or toxic epidermal necrolysis (TEN) or Sarabia-Ishaan Syndrome? No  Do you have a history of hepatitis, any hepatic impairment on the Problem List (such as Child-Martinez  Class C, cirrhosis, fatty liver disease, alcoholic liver disease), or was the last liver lab (hepatic panel, ALT, AST, ALK Phos, bilirubin) elevated in the past 6 months? No  Do you have any history of severe renal impairment (eGFR < 30mL/min)? No    Is patient eligible to continue? Yes, patient meets all eligibility requirements for the RN COVID treatment (as denoted by all no responses above).     Current Outpatient Medications   Medication Sig Dispense Refill    Acetaminophen (TYLENOL PO) Take 500 mg by mouth every 6 hours as needed for mild pain or fever      rosuvastatin (CRESTOR) 5 MG tablet Take 1 tablet (5 mg) by mouth daily 90 tablet 4    UNABLE TO FIND MEDICATION NAME: tumeric 2 capsules daily         Medications from List 1 of the standing order (on medications that exclude the use of Paxlovid) that patient is taking: NONE. Is patient taking Waldron's Wort? No  Is patient taking Waldron's Wort or any meds from List 1? No.   Medications from List 2 of the standing order (on meds that provider needs to adjust) that patient is taking: NONE. Is patient on any of the meds from List 2? No.   Medications from List 3 of standing order (on meds that a RN needs to adjust) that patient is taking: rosuvastatin (Crestor): Instructed patient to stop rosuvastatin while taking Paxlovid and restart rosuvastatin 1 day after the completion of Paxlovid.  Is patient on any meds from List 3? Yes. Patient is on meds from list 3. No meds require a provider visit and at least one med required RN to adjust.     Paxlovid has an approximate 90% reduction in hospitalization. Paxlovid can possibly cause altered sense of taste, diarrhea (loose, watery stools), high blood pressure, muscle aches.     Would patient like a Paxlovid prescription?   Yes.   Lab Results   Component Value Date    GFRESTIMATED >90 06/28/2024       Was last eGFR reduced? No GFR >90, normal renal dose  [address]    Temporary change to home medications:   rosuvastatin (Crestor): Instructed patient to stop rosuvastatin while taking Paxlovid and restart rosuvastatin 1 day after the completion of Paxlovid.     All medication adjustments (holds, etc) were discussed with the patient and patient was asked to repeat back (teachback) their med adjustment.  Did patient understand med adjustment? Yes, patient repeated back and understood correctly.        Reviewed the following instructions with the patient:    Paxlovid (nimatrelvir and ritonavir)    How it works  Two medicines (nirmatrelvir and ritonavir) are taken together. They stop the virus from growing. Less amount of virus is easier for your body to fight.    How to take  Medicine comes in a daily container with both medicine tablets. Take by mouth twice daily (once in the morning, once at night) for 5 days.  The number of tablets to take varies by patient.  Don't chew or break capsules. Swallow whole.    When to take  Take as soon as possible after positive COVID-19 test result, and within 5 days of your first symptoms.    Possible side effects  Can cause altered sense of taste, diarrhea (loose, watery stools), high blood pressure, muscle aches.    Leeann Silva RN

## 2024-11-11 NOTE — TELEPHONE ENCOUNTER
Patient calling needing to do the Paxlovid treatment protocol. She said she was returning a call from a nurse. Told her I would have an adult nurse call her back.     Alma Rosa Quiros RN

## 2024-12-21 ENCOUNTER — HEALTH MAINTENANCE LETTER (OUTPATIENT)
Age: 69
End: 2024-12-21

## 2025-05-29 ENCOUNTER — PATIENT OUTREACH (OUTPATIENT)
Dept: CARE COORDINATION | Facility: CLINIC | Age: 70
End: 2025-05-29
Payer: MEDICARE

## 2025-06-12 ENCOUNTER — PATIENT OUTREACH (OUTPATIENT)
Dept: CARE COORDINATION | Facility: CLINIC | Age: 70
End: 2025-06-12
Payer: MEDICARE

## 2025-08-06 DIAGNOSIS — E78.5 HYPERLIPIDEMIA LDL GOAL <130: ICD-10-CM

## 2025-08-08 PROBLEM — R73.01 ELEVATED FASTING GLUCOSE: Status: ACTIVE | Noted: 2025-08-08

## 2025-08-11 RX ORDER — ROSUVASTATIN CALCIUM 5 MG/1
5 TABLET, COATED ORAL DAILY
Qty: 90 TABLET | Refills: 4 | OUTPATIENT
Start: 2025-08-11

## 2025-08-13 ENCOUNTER — LAB (OUTPATIENT)
Dept: LAB | Facility: CLINIC | Age: 70
End: 2025-08-13
Payer: MEDICARE

## 2025-08-13 DIAGNOSIS — R73.01 ELEVATED FASTING GLUCOSE: ICD-10-CM

## 2025-08-13 DIAGNOSIS — E78.5 HYPERLIPIDEMIA LDL GOAL <130: ICD-10-CM

## 2025-08-13 LAB
ALBUMIN SERPL BCG-MCNC: 4.5 G/DL (ref 3.5–5.2)
ALP SERPL-CCNC: 59 U/L (ref 40–150)
ALT SERPL W P-5'-P-CCNC: 29 U/L (ref 0–50)
ANION GAP SERPL CALCULATED.3IONS-SCNC: 11 MMOL/L (ref 7–15)
AST SERPL W P-5'-P-CCNC: 24 U/L (ref 0–45)
BILIRUB SERPL-MCNC: 0.5 MG/DL
BUN SERPL-MCNC: 14.7 MG/DL (ref 8–23)
CALCIUM SERPL-MCNC: 9.8 MG/DL (ref 8.8–10.4)
CHLORIDE SERPL-SCNC: 105 MMOL/L (ref 98–107)
CHOLEST SERPL-MCNC: 193 MG/DL
CREAT SERPL-MCNC: 0.74 MG/DL (ref 0.51–0.95)
EGFRCR SERPLBLD CKD-EPI 2021: 86 ML/MIN/1.73M2
EST. AVERAGE GLUCOSE BLD GHB EST-MCNC: 117 MG/DL
FASTING STATUS PATIENT QL REPORTED: YES
FASTING STATUS PATIENT QL REPORTED: YES
GLUCOSE SERPL-MCNC: 113 MG/DL (ref 70–99)
HBA1C MFR BLD: 5.7 % (ref 0–5.6)
HCO3 SERPL-SCNC: 26 MMOL/L (ref 22–29)
HDLC SERPL-MCNC: 55 MG/DL
LDLC SERPL CALC-MCNC: 121 MG/DL
NONHDLC SERPL-MCNC: 138 MG/DL
POTASSIUM SERPL-SCNC: 4.1 MMOL/L (ref 3.4–5.3)
PROT SERPL-MCNC: 7.6 G/DL (ref 6.4–8.3)
SODIUM SERPL-SCNC: 142 MMOL/L (ref 135–145)
TRIGL SERPL-MCNC: 84 MG/DL

## 2025-08-13 PROCEDURE — 36415 COLL VENOUS BLD VENIPUNCTURE: CPT

## 2025-08-13 PROCEDURE — 83036 HEMOGLOBIN GLYCOSYLATED A1C: CPT

## 2025-08-13 PROCEDURE — 80053 COMPREHEN METABOLIC PANEL: CPT

## 2025-08-13 PROCEDURE — 80061 LIPID PANEL: CPT

## 2025-08-16 ENCOUNTER — HEALTH MAINTENANCE LETTER (OUTPATIENT)
Age: 70
End: 2025-08-16